# Patient Record
Sex: MALE | Race: WHITE | URBAN - METROPOLITAN AREA
[De-identification: names, ages, dates, MRNs, and addresses within clinical notes are randomized per-mention and may not be internally consistent; named-entity substitution may affect disease eponyms.]

---

## 2020-06-03 ENCOUNTER — OFFICE VISIT (OUTPATIENT)
Dept: URBAN - METROPOLITAN AREA TELEHEALTH 2 | Facility: TELEHEALTH | Age: 55
End: 2020-06-03

## 2020-09-11 ENCOUNTER — TELEPHONE ENCOUNTER (OUTPATIENT)
Dept: URBAN - METROPOLITAN AREA CLINIC 54 | Facility: CLINIC | Age: 55
End: 2020-09-11

## 2020-09-15 ENCOUNTER — TELEPHONE ENCOUNTER (OUTPATIENT)
Dept: URBAN - METROPOLITAN AREA CLINIC 54 | Facility: CLINIC | Age: 55
End: 2020-09-15

## 2020-09-15 ENCOUNTER — WEB ENCOUNTER (OUTPATIENT)
Dept: URBAN - NONMETROPOLITAN AREA CLINIC 4 | Facility: CLINIC | Age: 55
End: 2020-09-15

## 2020-10-13 ENCOUNTER — LAB OUTSIDE AN ENCOUNTER (OUTPATIENT)
Dept: URBAN - METROPOLITAN AREA CLINIC 54 | Facility: CLINIC | Age: 55
End: 2020-10-13

## 2020-10-13 ENCOUNTER — OFFICE VISIT (OUTPATIENT)
Dept: URBAN - METROPOLITAN AREA CLINIC 54 | Facility: CLINIC | Age: 55
End: 2020-10-13
Payer: COMMERCIAL

## 2020-10-13 ENCOUNTER — WEB ENCOUNTER (OUTPATIENT)
Dept: URBAN - METROPOLITAN AREA CLINIC 54 | Facility: CLINIC | Age: 55
End: 2020-10-13

## 2020-10-13 DIAGNOSIS — K63.5 COLON POLYPS: ICD-10-CM

## 2020-10-13 DIAGNOSIS — K74.69 OTHER CIRRHOSIS OF LIVER: ICD-10-CM

## 2020-10-13 DIAGNOSIS — K65.2 SPONTANEOUS BACTERIAL PERITONITIS: ICD-10-CM

## 2020-10-13 DIAGNOSIS — R18.8 ASCITES: ICD-10-CM

## 2020-10-13 DIAGNOSIS — I10 HTN (HYPERTENSION): ICD-10-CM

## 2020-10-13 DIAGNOSIS — K22.70 BARRETTS ESOPHAGUS: ICD-10-CM

## 2020-10-13 DIAGNOSIS — K57.90 DIVERTICULOSIS: ICD-10-CM

## 2020-10-13 PROBLEM — 398050005 DIVERTICULAR DISEASE OF COLON: Status: ACTIVE | Noted: 2020-10-13

## 2020-10-13 PROCEDURE — G8754 DIAS BP LESS 90: HCPCS | Performed by: INTERNAL MEDICINE

## 2020-10-13 PROCEDURE — G9909 DOC MED RSN NO TBCO INTERV: HCPCS | Performed by: INTERNAL MEDICINE

## 2020-10-13 PROCEDURE — G8482 FLU IMMUNIZE ORDER/ADMIN: HCPCS | Performed by: INTERNAL MEDICINE

## 2020-10-13 PROCEDURE — 74170 CT ABD WO CNTRST FLWD CNTRST: CPT | Performed by: INTERNAL MEDICINE

## 2020-10-13 PROCEDURE — 99215 OFFICE O/P EST HI 40 MIN: CPT | Performed by: INTERNAL MEDICINE

## 2020-10-13 PROCEDURE — G9902 PT SCRN TBCO AND ID AS USER: HCPCS | Performed by: INTERNAL MEDICINE

## 2020-10-13 PROCEDURE — 99214 OFFICE O/P EST MOD 30 MIN: CPT | Performed by: INTERNAL MEDICINE

## 2020-10-13 PROCEDURE — G8752 SYS BP LESS 140: HCPCS | Performed by: INTERNAL MEDICINE

## 2020-10-13 PROCEDURE — G8427 DOCREV CUR MEDS BY ELIG CLIN: HCPCS | Performed by: INTERNAL MEDICINE

## 2020-10-13 PROCEDURE — G8417 CALC BMI ABV UP PARAM F/U: HCPCS | Performed by: INTERNAL MEDICINE

## 2020-10-13 RX ORDER — EZETIMIBE 10 MG/1
TAKE 1 TABLET (10 MG) BY ORAL ROUTE ONCE DAILY TABLET ORAL 1
Qty: 0 | Refills: 0 | Status: ACTIVE | COMMUNITY
Start: 1900-01-01

## 2020-10-13 RX ORDER — LISINOPRIL 40 MG/1
TABLET ORAL
Qty: 0 | Refills: 0 | Status: ACTIVE | COMMUNITY
Start: 1900-01-01

## 2020-10-13 RX ORDER — PANTOPRAZOLE SODIUM 40 MG/1
TABLET, DELAYED RELEASE ORAL
Qty: 0 | Refills: 0 | Status: ACTIVE | COMMUNITY
Start: 1900-01-01

## 2020-10-13 RX ORDER — SIMVASTATIN 40 MG/1
TABLET, FILM COATED ORAL
Qty: 0 | Refills: 0 | Status: ACTIVE | COMMUNITY
Start: 1900-01-01

## 2020-10-13 RX ORDER — SPIRONOLACTONE 100 MG/1
1 TABLET TABLET, FILM COATED ORAL ONCE A DAY
Qty: 30 TABLET | Refills: 6 | OUTPATIENT

## 2020-10-13 RX ORDER — FUROSEMIDE 40 MG/1
1 TABLET TABLET ORAL ONCE A DAY
Qty: 30 TABLET | Refills: 6 | OUTPATIENT
Start: 2020-10-13

## 2020-10-13 RX ORDER — CELECOXIB 200 MG/1
CAPSULE ORAL
Qty: 0 | Refills: 0 | Status: ACTIVE | COMMUNITY
Start: 1900-01-01

## 2020-10-13 RX ORDER — PANTOPRAZOLE SODIUM 40 MG/1
TABLET, DELAYED RELEASE ORAL
Qty: 0
Start: 1900-01-01

## 2020-10-13 RX ORDER — AMLODIPINE BESYLATE 10 MG/1
TAKE 1 TABLET (10 MG) BY ORAL ROUTE ONCE DAILY TABLET ORAL 1
Qty: 0 | Refills: 0 | Status: ACTIVE | COMMUNITY
Start: 1900-01-01

## 2020-10-13 NOTE — HPI-TODAY'S VISIT:
Pt with hx of etoh cirrhosis, Pt reports that he is due for colonoscopy.  Pt with indigestion gas tingling and burning. Pt reports this is similar to pain with Barretts and ulcers.  Pt report that the back pain is paraspinal in nature. c/o nausea for 2 months.  Pt reports that he is taking laxatives.  Pt reports that he has black stools. Is taking celecoxib.  Pt reports that he continues to smoke and drink.  Pt last paracentesis with 5.7 liters removed +hx of SBP

## 2020-10-13 NOTE — PHYSICAL EXAM GASTROINTESTINAL
Abdomen ,, no guarding or rigidity , no masses palpable , normal bowel sounds , Liver and Spleen , no hepatomegaly present , splenomegaly +tenese ascites

## 2020-10-13 NOTE — EXAM-PHYSICAL EXAM
METFORMIN  MG Tablet         Last Written Prescription Date: 9/14/2016  Last Fill Quantity: 90, # refills: 1  Last Office Visit with G, P or [facility] prescribing provider:  10/3/2016        BP Readings from Last 3 Encounters:   10/03/16 124/70   08/22/16 120/70   07/12/16 120/68     MICROL       <5   10/3/2016  No results found for this basename: microalbumin  CREATININE   Date Value Ref Range Status   08/23/2016 0.91 0.66 - 1.25 mg/dL Final   ]  GFR ESTIMATE   Date Value Ref Range Status   08/23/2016 84 >60 mL/min/1.7m2 Final   07/13/2016 >90 >60 mL/min/1.7m2 Final   11/16/2015 75 >60 mL/min/1.7m2 Final     GFR ESTIMATE IF BLACK   Date Value Ref Range Status   08/23/2016 >90 >60 mL/min/1.7m2 Final   07/13/2016 >90 >60 mL/min/1.7m2 Final   11/16/2015 >90 >60 mL/min/1.7m2 Final     CHOL      112   7/13/2016  HDL       73   7/13/2016  LDL       30   7/13/2016  LDL     89.4   9/20/2012  TRIG       44   7/13/2016  CHOLHDLRATIO      1.9   11/16/2015  AST       22   7/13/2016  ALT       29   7/13/2016  A1C      7.3   10/3/2016  A1C      7.0   7/13/2016  A1C      6.7   2/17/2016  A1C      8.2   11/16/2015  A1C      7.2   5/4/2015  POTASSIUM   Date Value Ref Range Status   08/23/2016 4.1 3.4 - 5.3 mmol/L Final        spider nevi TEnse ascites +umbilical hernia 1+ edema b/; no asterexis

## 2020-10-14 LAB
A/G RATIO: 1.5
AFP, SERUM, TUMOR MARKER: 8.9
ALBUMIN: 4
ALKALINE PHOSPHATASE: 91
ALT (SGPT): 16
APTT: 33
AST (SGOT): 36
BASO (ABSOLUTE): 0
BASOS: 0
BILIRUBIN, TOTAL: 0.8
BUN/CREATININE RATIO: 11
BUN: 24
CALCIUM: 9.7
CARBON DIOXIDE, TOTAL: 18
CHLORIDE: 91
CREATININE: 2.14
EGFR IF AFRICN AM: 39
EGFR IF NONAFRICN AM: 33
EOS (ABSOLUTE): 0
EOS: 0
GLOBULIN, TOTAL: 2.7
GLUCOSE: 126
HEMATOCRIT: 40.2
HEMATOLOGY COMMENTS:: (no result)
HEMOGLOBIN: 14
IMMATURE CELLS: (no result)
IMMATURE GRANS (ABS): 0
IMMATURE GRANULOCYTES: 0
INR: 1.3
LYMPHS (ABSOLUTE): 1.1
LYMPHS: 10
MCH: 36.4
MCHC: 34.8
MCV: 104
MONOCYTES(ABSOLUTE): 0.8
MONOCYTES: 7
NEUTROPHILS (ABSOLUTE): 9.6
NEUTROPHILS: 83
NRBC: (no result)
PLATELETS: 183
POTASSIUM: 5
PROTEIN, TOTAL: 6.7
PROTHROMBIN TIME: 13.2
RBC: 3.85
RDW: 11.5
SODIUM: 127
WBC: 11.6

## 2020-11-04 ENCOUNTER — OFFICE VISIT (OUTPATIENT)
Dept: URBAN - METROPOLITAN AREA SURGERY CENTER 14 | Facility: SURGERY CENTER | Age: 55
End: 2020-11-04
Payer: COMMERCIAL

## 2020-11-04 DIAGNOSIS — K74.60 ADVANCED CIRRHOSIS OF LIVER: ICD-10-CM

## 2020-11-04 DIAGNOSIS — K31.89 ACQUIRED DEFORMITY OF DUODENUM: ICD-10-CM

## 2020-11-04 DIAGNOSIS — I85.10 ESOPH VARICE OTHER DIS: ICD-10-CM

## 2020-11-04 DIAGNOSIS — K22.8 COLUMNAR-LINED ESOPHAGUS: ICD-10-CM

## 2020-11-04 PROCEDURE — 43235 EGD DIAGNOSTIC BRUSH WASH: CPT | Performed by: INTERNAL MEDICINE

## 2020-11-04 PROCEDURE — G8907 PT DOC NO EVENTS ON DISCHARG: HCPCS | Performed by: INTERNAL MEDICINE

## 2020-11-04 RX ORDER — PANTOPRAZOLE SODIUM 40 MG/1
TABLET, DELAYED RELEASE ORAL
Qty: 0 | Status: ACTIVE | COMMUNITY
Start: 1900-01-01

## 2020-11-04 RX ORDER — SIMVASTATIN 40 MG/1
TABLET, FILM COATED ORAL
Qty: 0 | Refills: 0 | Status: ACTIVE | COMMUNITY
Start: 1900-01-01

## 2020-11-04 RX ORDER — CELECOXIB 200 MG/1
CAPSULE ORAL
Qty: 0 | Refills: 0 | Status: ACTIVE | COMMUNITY
Start: 1900-01-01

## 2020-11-04 RX ORDER — LISINOPRIL 40 MG/1
TABLET ORAL
Qty: 0 | Refills: 0 | Status: ACTIVE | COMMUNITY
Start: 1900-01-01

## 2020-11-04 RX ORDER — FUROSEMIDE 40 MG/1
1 TABLET TABLET ORAL ONCE A DAY
Qty: 30 TABLET | Refills: 6 | Status: ACTIVE | COMMUNITY
Start: 2020-10-13

## 2020-11-04 RX ORDER — AMLODIPINE BESYLATE 10 MG/1
TAKE 1 TABLET (10 MG) BY ORAL ROUTE ONCE DAILY TABLET ORAL 1
Qty: 0 | Refills: 0 | Status: ACTIVE | COMMUNITY
Start: 1900-01-01

## 2020-11-04 RX ORDER — SPIRONOLACTONE 100 MG/1
1 TABLET TABLET, FILM COATED ORAL ONCE A DAY
Qty: 30 TABLET | Refills: 6 | Status: ACTIVE | COMMUNITY

## 2020-11-04 RX ORDER — EZETIMIBE 10 MG/1
TAKE 1 TABLET (10 MG) BY ORAL ROUTE ONCE DAILY TABLET ORAL 1
Qty: 0 | Refills: 0 | Status: ACTIVE | COMMUNITY
Start: 1900-01-01

## 2020-11-12 ENCOUNTER — TELEPHONE ENCOUNTER (OUTPATIENT)
Dept: URBAN - METROPOLITAN AREA CLINIC 54 | Facility: CLINIC | Age: 55
End: 2020-11-12

## 2020-11-12 RX ORDER — SPIRONOLACTONE 100 MG/1
1 TABLET TABLET, FILM COATED ORAL BID
Qty: 60 TABLET | Refills: 2

## 2020-11-12 RX ORDER — FUROSEMIDE 40 MG/1
ORALLY TABLET ORAL BID
Qty: 60 | Refills: 2
Start: 2020-10-13

## 2020-11-20 ENCOUNTER — TELEPHONE ENCOUNTER (OUTPATIENT)
Dept: URBAN - METROPOLITAN AREA CLINIC 92 | Facility: CLINIC | Age: 55
End: 2020-11-20

## 2020-11-20 RX ORDER — CIPROFLOXACIN HYDROCHLORIDE 500 MG/1
TAKE 1 TABLET BY ORAL ROUTE DAILY FOR 30 DAYS TABLET, FILM COATED ORAL 1
Qty: 30 | Refills: 3

## 2020-12-18 ENCOUNTER — TELEPHONE ENCOUNTER (OUTPATIENT)
Dept: URBAN - METROPOLITAN AREA CLINIC 54 | Facility: CLINIC | Age: 55
End: 2020-12-18

## 2020-12-18 RX ORDER — PANTOPRAZOLE SODIUM 40 MG/1
1 TABLET TABLET, DELAYED RELEASE ORAL ONCE A DAY
Qty: 90 TABLET | Refills: 2

## 2020-12-28 ENCOUNTER — TELEPHONE ENCOUNTER (OUTPATIENT)
Dept: URBAN - NONMETROPOLITAN AREA CLINIC 4 | Facility: CLINIC | Age: 55
End: 2020-12-28

## 2020-12-29 ENCOUNTER — TELEPHONE ENCOUNTER (OUTPATIENT)
Dept: URBAN - METROPOLITAN AREA CLINIC 92 | Facility: CLINIC | Age: 55
End: 2020-12-29

## 2020-12-30 ENCOUNTER — TELEPHONE ENCOUNTER (OUTPATIENT)
Dept: URBAN - METROPOLITAN AREA CLINIC 96 | Facility: CLINIC | Age: 55
End: 2020-12-30

## 2021-01-12 ENCOUNTER — OFFICE VISIT (OUTPATIENT)
Dept: URBAN - NONMETROPOLITAN AREA CLINIC 4 | Facility: CLINIC | Age: 56
End: 2021-01-12
Payer: COMMERCIAL

## 2021-01-12 VITALS
TEMPERATURE: 98.1 F | SYSTOLIC BLOOD PRESSURE: 129 MMHG | DIASTOLIC BLOOD PRESSURE: 83 MMHG | BODY MASS INDEX: 29.48 KG/M2 | WEIGHT: 210.6 LBS | HEIGHT: 71 IN | HEART RATE: 115 BPM

## 2021-01-12 DIAGNOSIS — K74.69 OTHER CIRRHOSIS OF LIVER: ICD-10-CM

## 2021-01-12 DIAGNOSIS — R11.2 NAUSEA & VOMITING: ICD-10-CM

## 2021-01-12 DIAGNOSIS — K22.70 BARRETTS ESOPHAGUS: ICD-10-CM

## 2021-01-12 DIAGNOSIS — R18.8 ASCITES: ICD-10-CM

## 2021-01-12 PROCEDURE — 99215 OFFICE O/P EST HI 40 MIN: CPT | Performed by: INTERNAL MEDICINE

## 2021-01-12 RX ORDER — EZETIMIBE 10 MG/1
TAKE 1 TABLET (10 MG) BY ORAL ROUTE ONCE DAILY TABLET ORAL 1
Qty: 0 | Refills: 0 | Status: ACTIVE | COMMUNITY
Start: 1900-01-01

## 2021-01-12 RX ORDER — SIMVASTATIN 40 MG/1
TABLET, FILM COATED ORAL
Qty: 0 | Refills: 0 | Status: ACTIVE | COMMUNITY
Start: 1900-01-01

## 2021-01-12 RX ORDER — CIPROFLOXACIN HYDROCHLORIDE 500 MG/1
TAKE 1 TABLET BY ORAL ROUTE DAILY FOR 30 DAYS TABLET, FILM COATED ORAL 1
Qty: 30 | Refills: 3 | Status: ACTIVE | COMMUNITY

## 2021-01-12 RX ORDER — CELECOXIB 200 MG/1
CAPSULE ORAL
Qty: 0 | Refills: 0 | Status: DISCONTINUED | COMMUNITY
Start: 1900-01-01

## 2021-01-12 RX ORDER — SPIRONOLACTONE 100 MG/1
1 TABLET TABLET, FILM COATED ORAL BID
Qty: 60 TABLET | Refills: 2 | Status: ACTIVE | COMMUNITY

## 2021-01-12 RX ORDER — PANTOPRAZOLE SODIUM 40 MG/1
1 TABLET TABLET, DELAYED RELEASE ORAL ONCE A DAY
Qty: 90 TABLET | Refills: 2 | Status: ACTIVE | COMMUNITY

## 2021-01-12 RX ORDER — LISINOPRIL 40 MG/1
TABLET ORAL
Qty: 0 | Refills: 0 | Status: DISCONTINUED | COMMUNITY
Start: 1900-01-01

## 2021-01-12 RX ORDER — ONDANSETRON HYDROCHLORIDE 8 MG/1
1 TABLET AS NEEDED TABLET, FILM COATED ORAL ONCE A DAY
Qty: 30 TABLET | Refills: 2 | OUTPATIENT
Start: 2021-01-12

## 2021-01-12 RX ORDER — FUROSEMIDE 40 MG/1
ORALLY TABLET ORAL BID
Qty: 60 | Refills: 2 | Status: ACTIVE | COMMUNITY
Start: 2020-10-13

## 2021-01-12 RX ORDER — ICOSAPENT ETHYL 1000 MG/1
2 CAPSULES WITH MEALS CAPSULE ORAL TWICE A DAY
Status: ACTIVE | COMMUNITY

## 2021-01-12 RX ORDER — AMLODIPINE BESYLATE 10 MG/1
TAKE 1 TABLET (10 MG) BY ORAL ROUTE ONCE DAILY TABLET ORAL 1
Qty: 0 | Refills: 0 | Status: ACTIVE | COMMUNITY
Start: 1900-01-01

## 2021-01-12 NOTE — HPI-TODAY'S VISIT:
hx of etoh cirrhosis present for f/uvisit. Pt reports nausea since this morning. pt withhx of dm/htn.dyslipidemia and SBP. UTD on HCC screening and EGD surveillance with EGD 12/2020 small varices and HCC surv with CT scan in Nov 2020 neg for lesions., On Cipro daily. Last LVP Dec 30 2020 with 9..6 L removed. Secondary hepatic w/u neg. Continues to drink.

## 2021-01-19 ENCOUNTER — OFFICE VISIT (OUTPATIENT)
Dept: URBAN - NONMETROPOLITAN AREA CLINIC 4 | Facility: CLINIC | Age: 56
End: 2021-01-19
Payer: COMMERCIAL

## 2021-01-19 VITALS
DIASTOLIC BLOOD PRESSURE: 73 MMHG | HEART RATE: 87 BPM | BODY MASS INDEX: 26.8 KG/M2 | WEIGHT: 191.4 LBS | TEMPERATURE: 97.3 F | SYSTOLIC BLOOD PRESSURE: 157 MMHG | HEIGHT: 71 IN

## 2021-01-19 DIAGNOSIS — K22.70 BARRETTS ESOPHAGUS: ICD-10-CM

## 2021-01-19 DIAGNOSIS — Z72.0 TOBACCO ABUSE: ICD-10-CM

## 2021-01-19 DIAGNOSIS — K74.60 CIRRHOSIS: ICD-10-CM

## 2021-01-19 DIAGNOSIS — I10 HTN (HYPERTENSION): ICD-10-CM

## 2021-01-19 DIAGNOSIS — K63.5 COLON POLYPS: ICD-10-CM

## 2021-01-19 DIAGNOSIS — E11.9 DIABETES: ICD-10-CM

## 2021-01-19 DIAGNOSIS — E87.1 HYPONATREMIA: ICD-10-CM

## 2021-01-19 DIAGNOSIS — R18.8 ASCITES: ICD-10-CM

## 2021-01-19 DIAGNOSIS — R11.2 NAUSEA & VOMITING: ICD-10-CM

## 2021-01-19 PROCEDURE — 99213 OFFICE O/P EST LOW 20 MIN: CPT | Performed by: REGISTERED NURSE

## 2021-01-19 PROCEDURE — 3017F COLORECTAL CA SCREEN DOC REV: CPT | Performed by: REGISTERED NURSE

## 2021-01-19 PROCEDURE — G8427 DOCREV CUR MEDS BY ELIG CLIN: HCPCS | Performed by: REGISTERED NURSE

## 2021-01-19 PROCEDURE — G8417 CALC BMI ABV UP PARAM F/U: HCPCS | Performed by: REGISTERED NURSE

## 2021-01-19 PROCEDURE — G8482 FLU IMMUNIZE ORDER/ADMIN: HCPCS | Performed by: REGISTERED NURSE

## 2021-01-19 PROCEDURE — 4004F PT TOBACCO SCREEN RCVD TLK: CPT | Performed by: REGISTERED NURSE

## 2021-01-19 PROCEDURE — G8754 DIAS BP LESS 90: HCPCS | Performed by: REGISTERED NURSE

## 2021-01-19 PROCEDURE — G8752 SYS BP LESS 140: HCPCS | Performed by: REGISTERED NURSE

## 2021-01-19 RX ORDER — SPIRONOLACTONE 100 MG/1
1 TABLET TABLET, FILM COATED ORAL BID
Qty: 60 TABLET | Refills: 2 | Status: ACTIVE | COMMUNITY

## 2021-01-19 RX ORDER — ONDANSETRON HYDROCHLORIDE 8 MG/1
1 TABLET AS NEEDED TABLET, FILM COATED ORAL ONCE A DAY
Qty: 30 TABLET | Refills: 2 | Status: ACTIVE | COMMUNITY

## 2021-01-19 RX ORDER — CIPROFLOXACIN HYDROCHLORIDE 500 MG/1
TAKE 1 TABLET BY ORAL ROUTE DAILY FOR 30 DAYS TABLET, FILM COATED ORAL 1
Qty: 30 | Refills: 3 | Status: ACTIVE | COMMUNITY

## 2021-01-19 RX ORDER — PANTOPRAZOLE SODIUM 40 MG/1
1 TABLET TABLET, DELAYED RELEASE ORAL ONCE A DAY
Qty: 90 TABLET | Refills: 2 | Status: ACTIVE | COMMUNITY

## 2021-01-19 RX ORDER — SIMVASTATIN 40 MG/1
TABLET, FILM COATED ORAL
Qty: 0 | Refills: 0 | Status: ACTIVE | COMMUNITY

## 2021-01-19 RX ORDER — AMLODIPINE BESYLATE 10 MG/1
TAKE 1 TABLET (10 MG) BY ORAL ROUTE ONCE DAILY TABLET ORAL 1
Qty: 0 | Refills: 0 | Status: ACTIVE | COMMUNITY

## 2021-01-19 RX ORDER — ICOSAPENT ETHYL 1000 MG/1
2 CAPSULES WITH MEALS CAPSULE ORAL TWICE A DAY
Status: ACTIVE | COMMUNITY

## 2021-01-19 RX ORDER — FUROSEMIDE 40 MG/1
ORALLY TABLET ORAL BID
Qty: 60 | Refills: 2 | Status: ACTIVE | COMMUNITY

## 2021-01-19 RX ORDER — EZETIMIBE 10 MG/1
TAKE 1 TABLET (10 MG) BY ORAL ROUTE ONCE DAILY TABLET ORAL 1
Qty: 0 | Refills: 0 | Status: ACTIVE | COMMUNITY

## 2021-01-19 NOTE — HPI-TODAY'S VISIT:
hx of etoh cirrhosis present for f/uvisit. Pt reports nausea since this morning. pt withhx of dm/htn.dyslipidemia and SBP. UTD on HCC screening and EGD surveillance with EGD 12/2020 small varices and HCC surv with CT scan in Nov 2020 neg for lesions., On Cipro daily. Last LVP Dec 30 2020 with 9..6 L removed. Secondary hepatic w/u neg. Continues to drink. Pt with hx of etoh cirrhosis, Pt reports that he is due for colonoscopy.  Pt with indigestion gas tingling and burning. Pt reports this is similar to pain with Barretts and ulcers.  Pt report that the back pain is paraspinal in nature. c/o nausea for 2 months.  Pt reports that he is taking laxatives.  Pt reports that he has black stools. Is taking celecoxib.  Pt reports that he continues to smoke and drink.  Pt last paracentesis with 5.7 liters removed +hx of SBP   1/19/21: Pt RTC for f/u. He had US para on 1/12 with 8400 mL removed. Fluid studies not c/w SBP. MELD 21. He remains on aldactone and lasix, but only taking one of them once a day. He denies any abdominal pain or reaccumulation of ascites. Last alcoholic drink on 1/11/21. He started taking a Florajen probiotic recently, which has been helping with his digestion. His last cscope was 12/20/2018, due for surveillance in 2021.

## 2021-01-19 NOTE — PHYSICAL EXAM GASTROINTESTINAL
Abdomen , soft, excess abdominal fat, nontender, nondistended , no guarding or rigidity , no masses palpable , normal bowel sounds , Liver and Spleen , no hepatomegaly present , no hepatosplenomegaly , liver nontender , spleen not palpable

## 2021-01-19 NOTE — PHYSICAL EXAM HENT:
Head, normocephalic, atraumatic, Face, Face within normal limits, Ears, External ears within normal limits,

## 2021-01-20 ENCOUNTER — OFFICE VISIT (OUTPATIENT)
Dept: URBAN - NONMETROPOLITAN AREA CLINIC 4 | Facility: CLINIC | Age: 56
End: 2021-01-20
Payer: COMMERCIAL

## 2021-01-20 DIAGNOSIS — R18.8 ASCITES: ICD-10-CM

## 2021-01-20 DIAGNOSIS — K63.5 COLON POLYPS: ICD-10-CM

## 2021-01-20 DIAGNOSIS — E11.9 DIABETES: ICD-10-CM

## 2021-01-20 DIAGNOSIS — R11.2 NAUSEA & VOMITING: ICD-10-CM

## 2021-01-20 DIAGNOSIS — E87.1 HYPONATREMIA: ICD-10-CM

## 2021-01-20 DIAGNOSIS — I85.00 ESOPHAGEAL VARICES: ICD-10-CM

## 2021-01-20 DIAGNOSIS — Z72.0 TOBACCO ABUSE: ICD-10-CM

## 2021-01-20 DIAGNOSIS — K22.70 BARRETTS ESOPHAGUS: ICD-10-CM

## 2021-01-20 DIAGNOSIS — I10 HTN (HYPERTENSION): ICD-10-CM

## 2021-01-20 DIAGNOSIS — K74.60 CIRRHOSIS: ICD-10-CM

## 2021-01-20 PROCEDURE — 1036F TOBACCO NON-USER: CPT | Performed by: INTERNAL MEDICINE

## 2021-01-20 PROCEDURE — G8417 CALC BMI ABV UP PARAM F/U: HCPCS | Performed by: INTERNAL MEDICINE

## 2021-01-20 PROCEDURE — G8482 FLU IMMUNIZE ORDER/ADMIN: HCPCS | Performed by: INTERNAL MEDICINE

## 2021-01-20 PROCEDURE — 99214 OFFICE O/P EST MOD 30 MIN: CPT | Performed by: INTERNAL MEDICINE

## 2021-01-20 PROCEDURE — 3017F COLORECTAL CA SCREEN DOC REV: CPT | Performed by: INTERNAL MEDICINE

## 2021-01-20 PROCEDURE — G8427 DOCREV CUR MEDS BY ELIG CLIN: HCPCS | Performed by: INTERNAL MEDICINE

## 2021-01-20 RX ORDER — CIPROFLOXACIN HYDROCHLORIDE 500 MG/1
TAKE 1 TABLET BY ORAL ROUTE DAILY FOR 30 DAYS TABLET, FILM COATED ORAL 1
Qty: 30 | Refills: 3 | Status: ACTIVE | COMMUNITY

## 2021-01-20 RX ORDER — SPIRONOLACTONE 100 MG/1
1 TABLET TABLET, FILM COATED ORAL BID
Qty: 60 TABLET | Refills: 2 | Status: ACTIVE | COMMUNITY

## 2021-01-20 RX ORDER — PANTOPRAZOLE SODIUM 40 MG/1
1 TABLET TABLET, DELAYED RELEASE ORAL ONCE A DAY
Qty: 90 TABLET | Refills: 2 | Status: ACTIVE | COMMUNITY

## 2021-01-20 RX ORDER — SIMVASTATIN 40 MG/1
TABLET, FILM COATED ORAL
Qty: 0 | Refills: 0 | Status: ACTIVE | COMMUNITY

## 2021-01-20 RX ORDER — AMLODIPINE BESYLATE 10 MG/1
TAKE 1 TABLET (10 MG) BY ORAL ROUTE ONCE DAILY TABLET ORAL 1
Qty: 0 | Refills: 0 | Status: ACTIVE | COMMUNITY

## 2021-01-20 RX ORDER — EZETIMIBE 10 MG/1
TAKE 1 TABLET (10 MG) BY ORAL ROUTE ONCE DAILY TABLET ORAL 1
Qty: 0 | Refills: 0 | Status: ACTIVE | COMMUNITY

## 2021-01-20 RX ORDER — ICOSAPENT ETHYL 1000 MG/1
2 CAPSULES WITH MEALS CAPSULE ORAL TWICE A DAY
Status: ACTIVE | COMMUNITY

## 2021-01-20 RX ORDER — ONDANSETRON HYDROCHLORIDE 8 MG/1
1 TABLET AS NEEDED TABLET, FILM COATED ORAL ONCE A DAY
Qty: 30 TABLET | Refills: 2 | Status: ACTIVE | COMMUNITY

## 2021-01-20 RX ORDER — FUROSEMIDE 40 MG/1
ORALLY TABLET ORAL BID
Qty: 60 | Refills: 2 | Status: ACTIVE | COMMUNITY

## 2021-01-20 NOTE — HPI-TODAY'S VISIT:
hx of etoh cirrhosis present for f/uvisit. Pt reports nausea since this morning. pt withhx of dm/htn.dyslipidemia and SBP. UTD on HCC screening and EGD surveillance with EGD 12/2020 small varices and HCC surv with CT scan in Nov 2020 neg for lesions., On Cipro daily. Last LVP Dec 30 2020 with 9..6 L removed. Secondary hepatic w/u neg. Continues to drink. Pt with hx of etoh cirrhosis, Pt reports that he is due for colonoscopy.  Pt with indigestion gas tingling and burning. Pt reports this is similar to pain with Barretts and ulcers.  Pt report that the back pain is paraspinal in nature. c/o nausea for 2 months.  Pt reports that he is taking laxatives.  Pt reports that he has black stools. Is taking celecoxib.  Pt reports that he continues to smoke and drink.  Pt last paracentesis with 5.7 liters removed +hx of SBP   1/19/21: Pt RTC for f/u. He had US para on 1/12 with 8400 mL removed. Fluid studies not c/w SBP. MELD 21. He remains on aldactone and lasix, but only taking one of them once a day. He denies any abdominal pain or reaccumulation of ascites. Last alcoholic drink on 1/11/21. He started taking a Florajen probiotic recently, which has been helping with his digestion. His last cscope was 12/20/2018, due for surveillance in 2021.  Follow Up 1/20/21: Patient presents for liver related opinion. He carries a diagnosis of ETOH cirrhosis with portal hypertension requiring LVPs. His last drink was  1/11/21. He smokes 1 ppd x 50 years. He had cataract surgery Nov 2020. He has never been evaluated for OLT. He works as a  and involves travel. He lives with his girlfriend in [place]. His diaghter is a NP in [place].

## 2021-01-20 NOTE — PHYSICAL EXAM CONSTITUTIONAL:
well developed, well nourished , in no acute distress , ambulating without difficulty , normal communication ability 
labored

## 2021-01-20 NOTE — PHYSICAL EXAM GASTROINTESTINAL
Abdomen , soft, nontender, moderately distended with ascites , no guarding or rigidity , no masses palpable , normal bowel sounds , Liver and Spleen , no hepatomegaly present , no hepatosplenomegaly , liver nontender , spleen not palpable

## 2021-03-12 ENCOUNTER — TELEPHONE ENCOUNTER (OUTPATIENT)
Dept: URBAN - METROPOLITAN AREA CLINIC 54 | Facility: CLINIC | Age: 56
End: 2021-03-12

## 2021-04-06 ENCOUNTER — TELEPHONE ENCOUNTER (OUTPATIENT)
Dept: URBAN - METROPOLITAN AREA CLINIC 54 | Facility: CLINIC | Age: 56
End: 2021-04-06

## 2021-04-20 LAB
A/G RATIO: 1.6
ALBUMIN: 3.7
ALKALINE PHOSPHATASE: 130
ALT (SGPT): 19
AST (SGOT): 39
BASO (ABSOLUTE): 0
BASOS: 0
BILIRUBIN, TOTAL: 0.5
BUN/CREATININE RATIO: 20
BUN: 19
CALCIUM: 9
CARBON DIOXIDE, TOTAL: 20
CHLORIDE: 97
CREATININE: 0.93
EGFR IF AFRICN AM: 106
EGFR IF NONAFRICN AM: 91
EOS (ABSOLUTE): 0
EOS: 1
GLOBULIN, TOTAL: 2.3
GLUCOSE: 109
HEMATOCRIT: 38.6
HEMATOLOGY COMMENTS:: (no result)
HEMOGLOBIN: 13.5
IMMATURE CELLS: (no result)
IMMATURE GRANS (ABS): 0
IMMATURE GRANULOCYTES: 1
INR: 1.1
LYMPHS (ABSOLUTE): 1.3
LYMPHS: 16
MCH: 35.7
MCHC: 35
MCV: 102
MONOCYTES(ABSOLUTE): 0.7
MONOCYTES: 9
NEUTROPHILS (ABSOLUTE): 6.1
NEUTROPHILS: 73
NRBC: (no result)
PLATELETS: 191
POTASSIUM: 4.3
PROTEIN, TOTAL: 6
PROTHROMBIN TIME: 11.3
RBC: 3.78
RDW: 12.9
SODIUM: 132
WBC: 8.2

## 2021-04-21 ENCOUNTER — OFFICE VISIT (OUTPATIENT)
Dept: URBAN - NONMETROPOLITAN AREA CLINIC 4 | Facility: CLINIC | Age: 56
End: 2021-04-21
Payer: COMMERCIAL

## 2021-04-21 DIAGNOSIS — I10 HTN (HYPERTENSION): ICD-10-CM

## 2021-04-21 DIAGNOSIS — K74.60 CIRRHOSIS: ICD-10-CM

## 2021-04-21 DIAGNOSIS — R11.2 NAUSEA & VOMITING: ICD-10-CM

## 2021-04-21 DIAGNOSIS — R18.8 ASCITES: ICD-10-CM

## 2021-04-21 DIAGNOSIS — E87.1 HYPONATREMIA: ICD-10-CM

## 2021-04-21 DIAGNOSIS — Z72.0 TOBACCO ABUSE: ICD-10-CM

## 2021-04-21 DIAGNOSIS — E11.9 DIABETES: ICD-10-CM

## 2021-04-21 DIAGNOSIS — K22.70 BARRETTS ESOPHAGUS: ICD-10-CM

## 2021-04-21 DIAGNOSIS — I85.00 ESOPHAGEAL VARICES: ICD-10-CM

## 2021-04-21 DIAGNOSIS — K63.5 COLON POLYPS: ICD-10-CM

## 2021-04-21 PROBLEM — 389026000 ASCITES: Status: ACTIVE | Noted: 2020-10-13

## 2021-04-21 PROBLEM — 28670008 ESOPHAGEAL VARICES: Status: ACTIVE | Noted: 2021-01-20

## 2021-04-21 PROBLEM — 255417007 CIRRHOTIC: Status: ACTIVE | Noted: 2020-10-13

## 2021-04-21 PROBLEM — 111552007 DIABETES MELLITUS WITHOUT COMPLICATION: Status: ACTIVE | Noted: 2021-01-19

## 2021-04-21 PROBLEM — 38341003 HYPERTENSION: Status: ACTIVE | Noted: 2020-10-13

## 2021-04-21 PROCEDURE — 99214 OFFICE O/P EST MOD 30 MIN: CPT | Performed by: INTERNAL MEDICINE

## 2021-04-21 RX ORDER — CIPROFLOXACIN HYDROCHLORIDE 500 MG/1
TAKE 1 TABLET BY ORAL ROUTE DAILY FOR 30 DAYS TABLET, FILM COATED ORAL 1
Qty: 30 | Refills: 3 | Status: ACTIVE | COMMUNITY

## 2021-04-21 RX ORDER — FUROSEMIDE 40 MG/1
ORALLY TABLET ORAL BID
Qty: 60 | Refills: 2 | Status: ACTIVE | COMMUNITY

## 2021-04-21 RX ORDER — ICOSAPENT ETHYL 1000 MG/1
2 CAPSULES WITH MEALS CAPSULE ORAL TWICE A DAY
Status: ACTIVE | COMMUNITY

## 2021-04-21 RX ORDER — PANTOPRAZOLE SODIUM 40 MG/1
1 TABLET TABLET, DELAYED RELEASE ORAL ONCE A DAY
Qty: 90 TABLET | Refills: 2 | Status: ACTIVE | COMMUNITY

## 2021-04-21 RX ORDER — SPIRONOLACTONE 100 MG/1
1 TABLET TABLET, FILM COATED ORAL BID
Qty: 60 TABLET | Refills: 2 | Status: ACTIVE | COMMUNITY

## 2021-04-21 RX ORDER — EZETIMIBE 10 MG/1
TAKE 1 TABLET (10 MG) BY ORAL ROUTE ONCE DAILY TABLET ORAL 1
Qty: 0 | Refills: 0 | Status: ACTIVE | COMMUNITY

## 2021-04-21 RX ORDER — ONDANSETRON HYDROCHLORIDE 8 MG/1
1 TABLET AS NEEDED TABLET, FILM COATED ORAL ONCE A DAY
Qty: 30 TABLET | Refills: 2 | Status: ACTIVE | COMMUNITY

## 2021-04-21 RX ORDER — SIMVASTATIN 40 MG/1
TABLET, FILM COATED ORAL
Qty: 0 | Refills: 0 | Status: ACTIVE | COMMUNITY

## 2021-04-21 RX ORDER — AMLODIPINE BESYLATE 10 MG/1
TAKE 1 TABLET (10 MG) BY ORAL ROUTE ONCE DAILY TABLET ORAL 1
Qty: 0 | Refills: 0 | Status: ACTIVE | COMMUNITY

## 2021-04-21 NOTE — PHYSICAL EXAM GASTROINTESTINAL
Abdomen , soft, nontender, severely distended with ascites , no guarding or rigidity , no masses palpable , normal bowel sounds , Liver and Spleen , no hepatosplenomegaly palpable, liver nontender , spleen not palpable

## 2021-04-21 NOTE — HPI-TODAY'S VISIT:
hx of etoh cirrhosis present for f/uvisit. Pt reports nausea since this morning. pt withhx of dm/htn.dyslipidemia and SBP. UTD on HCC screening and EGD surveillance with EGD 12/2020 small varices and HCC surv with CT scan in Nov 2020 neg for lesions., On Cipro daily. Last LVP Dec 30 2020 with 9..6 L removed. Secondary hepatic w/u neg. Continues to drink. Pt with hx of etoh cirrhosis, Pt reports that he is due for colonoscopy.  Pt with indigestion gas tingling and burning. Pt reports this is similar to pain with Barretts and ulcers.  Pt report that the back pain is paraspinal in nature. c/o nausea for 2 months.  Pt reports that he is taking laxatives.  Pt reports that he has black stools. Is taking celecoxib.  Pt reports that he continues to smoke and drink.  Pt last paracentesis with 5.7 liters removed , +hx of SBP   1/19/21: Pt RTC for f/u. He had US para on 1/12 with 8400 mL removed. Fluid studies not c/w SBP. MELD 21. He remains on aldactone and lasix, but only taking one of them once a day. He denies any abdominal pain or reaccumulation of ascites. Last alcoholic drink on 1/11/21. He started taking a Florajen probiotic recently, which has been helping with his digestion. His last cscope was 12/20/2018, due for surveillance in 2021.  Follow Up 1/20/21: Patient presents for liver related opinion. He carries a diagnosis of ETOH cirrhosis with portal hypertension requiring LVPs. His last drink was  1/11/21. He smokes 1 ppd x 50 years. He had cataract surgery Nov 2020. He has never been evaluated for OLT. He works as a  and involves travel. He lives with his girlfriend in [place]. His diaghter is a NP in [place].  Follow Up 4/21/21: Pateint presents for routine follow up. He c/o LUQ dull ache which improves with LVP. His freqency is 2-4 weeks. He A200 and L80 with no results. MELD is 7 but MELD-Na 13 on recent labs. He is consuming on average 5 beers per week. Remains a smoker. No signs of PSE or GIB.

## 2021-05-04 ENCOUNTER — TELEPHONE ENCOUNTER (OUTPATIENT)
Dept: URBAN - METROPOLITAN AREA CLINIC 92 | Facility: CLINIC | Age: 56
End: 2021-05-04

## 2021-05-04 PROBLEM — 389026000: Status: ACTIVE | Noted: 2021-03-12

## 2021-05-07 ENCOUNTER — TELEPHONE ENCOUNTER (OUTPATIENT)
Dept: URBAN - METROPOLITAN AREA CLINIC 54 | Facility: CLINIC | Age: 56
End: 2021-05-07

## 2021-05-07 RX ORDER — CIPROFLOXACIN HYDROCHLORIDE 500 MG/1
1 TABLET TABLET, FILM COATED ORAL QD
Qty: 30 | Refills: 3

## 2021-05-11 ENCOUNTER — TELEPHONE ENCOUNTER (OUTPATIENT)
Dept: URBAN - METROPOLITAN AREA CLINIC 54 | Facility: CLINIC | Age: 56
End: 2021-05-11

## 2021-05-11 RX ORDER — SPIRONOLACTONE 100 MG/1
1 TABLET TABLET, FILM COATED ORAL BID
Qty: 60 TABLET | Refills: 6

## 2021-05-18 ENCOUNTER — LAB OUTSIDE AN ENCOUNTER (OUTPATIENT)
Dept: URBAN - METROPOLITAN AREA CLINIC 92 | Facility: CLINIC | Age: 56
End: 2021-05-18

## 2021-06-01 ENCOUNTER — LAB OUTSIDE AN ENCOUNTER (OUTPATIENT)
Dept: URBAN - METROPOLITAN AREA CLINIC 92 | Facility: CLINIC | Age: 56
End: 2021-06-01

## 2021-06-15 ENCOUNTER — LAB OUTSIDE AN ENCOUNTER (OUTPATIENT)
Dept: URBAN - METROPOLITAN AREA CLINIC 92 | Facility: CLINIC | Age: 56
End: 2021-06-15

## 2021-06-24 ENCOUNTER — TELEPHONE ENCOUNTER (OUTPATIENT)
Dept: URBAN - METROPOLITAN AREA CLINIC 54 | Facility: CLINIC | Age: 56
End: 2021-06-24

## 2021-06-24 RX ORDER — SPIRONOLACTONE 100 MG/1
1 TABLET TABLET, FILM COATED ORAL BID
Qty: 180 TABLET | Refills: 3

## 2021-06-29 ENCOUNTER — LAB OUTSIDE AN ENCOUNTER (OUTPATIENT)
Dept: URBAN - METROPOLITAN AREA CLINIC 92 | Facility: CLINIC | Age: 56
End: 2021-06-29

## 2021-07-13 ENCOUNTER — LAB OUTSIDE AN ENCOUNTER (OUTPATIENT)
Dept: URBAN - METROPOLITAN AREA CLINIC 92 | Facility: CLINIC | Age: 56
End: 2021-07-13

## 2021-07-27 ENCOUNTER — LAB OUTSIDE AN ENCOUNTER (OUTPATIENT)
Dept: URBAN - METROPOLITAN AREA CLINIC 92 | Facility: CLINIC | Age: 56
End: 2021-07-27

## 2021-09-14 ENCOUNTER — TELEPHONE ENCOUNTER (OUTPATIENT)
Dept: URBAN - NONMETROPOLITAN AREA CLINIC 4 | Facility: CLINIC | Age: 56
End: 2021-09-14

## 2021-09-14 ENCOUNTER — TELEPHONE ENCOUNTER (OUTPATIENT)
Dept: URBAN - METROPOLITAN AREA CLINIC 54 | Facility: CLINIC | Age: 56
End: 2021-09-14

## 2021-09-14 RX ORDER — FUROSEMIDE 40 MG/1
1 TABLET TABLET ORAL BID
Qty: 60 TABLET | Refills: 2

## 2021-09-22 ENCOUNTER — TELEPHONE ENCOUNTER (OUTPATIENT)
Dept: URBAN - METROPOLITAN AREA CLINIC 54 | Facility: CLINIC | Age: 56
End: 2021-09-22

## 2021-09-22 RX ORDER — FUROSEMIDE 40 MG/1
1 TABLET TABLET ORAL BID
Qty: 180 TABLET | Refills: 3

## 2021-09-22 RX ORDER — SPIRONOLACTONE 100 MG/1
1 TABLET TABLET, FILM COATED ORAL BID
Qty: 180 TABLET | Refills: 3
End: 2022-09-17

## 2021-10-13 ENCOUNTER — OFFICE VISIT (OUTPATIENT)
Dept: URBAN - NONMETROPOLITAN AREA CLINIC 4 | Facility: CLINIC | Age: 56
End: 2021-10-13
Payer: COMMERCIAL

## 2021-10-13 ENCOUNTER — TELEPHONE ENCOUNTER (OUTPATIENT)
Dept: URBAN - METROPOLITAN AREA CLINIC 54 | Facility: CLINIC | Age: 56
End: 2021-10-13

## 2021-10-13 VITALS
DIASTOLIC BLOOD PRESSURE: 55 MMHG | TEMPERATURE: 97.9 F | SYSTOLIC BLOOD PRESSURE: 109 MMHG | BODY MASS INDEX: 20.72 KG/M2 | HEART RATE: 96 BPM | HEIGHT: 71 IN | WEIGHT: 148 LBS

## 2021-10-13 DIAGNOSIS — E43 SEVERE PROTEIN-CALORIE MALNUTRITION: ICD-10-CM

## 2021-10-13 DIAGNOSIS — F10.20 UNCOMPLICATED ALCOHOL DEPENDENCE: ICD-10-CM

## 2021-10-13 DIAGNOSIS — K70.31 ALCOHOLIC CIRRHOSIS OF LIVER WITH ASCITES: ICD-10-CM

## 2021-10-13 DIAGNOSIS — K31.89 OTHER DISEASES OF STOMACH AND DUODENUM: ICD-10-CM

## 2021-10-13 DIAGNOSIS — K22.719 BARRETT'S ESOPHAGUS WITH DYSPLASIA: ICD-10-CM

## 2021-10-13 DIAGNOSIS — K76.6 PORTAL HYPERTENSION: ICD-10-CM

## 2021-10-13 DIAGNOSIS — I85.10 SECONDARY ESOPHAGEAL VARICES WITHOUT BLEEDING: ICD-10-CM

## 2021-10-13 PROCEDURE — 99214 OFFICE O/P EST MOD 30 MIN: CPT | Performed by: INTERNAL MEDICINE

## 2021-10-13 RX ORDER — CIPROFLOXACIN HYDROCHLORIDE 500 MG/1
1 TABLET TABLET, FILM COATED ORAL QD
Qty: 30 | Refills: 3 | Status: DISCONTINUED | COMMUNITY

## 2021-10-13 RX ORDER — FUROSEMIDE 40 MG/1
1 TABLET TABLET ORAL BID
Qty: 180 TABLET | Refills: 3 | Status: ACTIVE | COMMUNITY

## 2021-10-13 RX ORDER — SIMVASTATIN 40 MG/1
TABLET, FILM COATED ORAL
Qty: 0 | Refills: 0 | Status: DISCONTINUED | COMMUNITY

## 2021-10-13 RX ORDER — PANTOPRAZOLE SODIUM 40 MG/1
1 TABLET TABLET, DELAYED RELEASE ORAL ONCE A DAY
Qty: 90 TABLET | Refills: 2 | Status: ACTIVE | COMMUNITY

## 2021-10-13 RX ORDER — EZETIMIBE 10 MG/1
TAKE 1 TABLET (10 MG) BY ORAL ROUTE ONCE DAILY TABLET ORAL 1
Qty: 0 | Refills: 0 | Status: DISCONTINUED | COMMUNITY

## 2021-10-13 RX ORDER — ONDANSETRON HYDROCHLORIDE 8 MG/1
1 TABLET AS NEEDED TABLET, FILM COATED ORAL ONCE A DAY
Qty: 30 TABLET | Refills: 2 | Status: DISCONTINUED | COMMUNITY

## 2021-10-13 RX ORDER — SPIRONOLACTONE 100 MG/1
1 TABLET TABLET, FILM COATED ORAL BID
Qty: 180 TABLET | Refills: 3 | Status: ACTIVE | COMMUNITY

## 2021-10-13 RX ORDER — ICOSAPENT ETHYL 1000 MG/1
2 CAPSULES WITH MEALS CAPSULE ORAL TWICE A DAY
Status: ACTIVE | COMMUNITY

## 2021-10-13 RX ORDER — AMLODIPINE BESYLATE 10 MG/1
TAKE 1 TABLET (10 MG) BY ORAL ROUTE ONCE DAILY TABLET ORAL 1
Qty: 0 | Refills: 0 | Status: DISCONTINUED | COMMUNITY

## 2021-10-13 NOTE — HPI-TODAY'S VISIT:
hx of etoh cirrhosis present for f/uvisit. Pt reports nausea since this morning. pt withhx of dm/htn.dyslipidemia and SBP. UTD on HCC screening and EGD surveillance with EGD 12/2020 small varices and HCC surv with CT scan in Nov 2020 neg for lesions., On Cipro daily. Last LVP Dec 30 2020 with 9..6 L removed. Secondary hepatic w/u neg. Continues to drink. Pt with hx of etoh cirrhosis, Pt reports that he is due for colonoscopy.  Pt with indigestion gas tingling and burning. Pt reports this is similar to pain with Barretts and ulcers.  Pt report that the back pain is paraspinal in nature. c/o nausea for 2 months.  Pt reports that he is taking laxatives.  Pt reports that he has black stools. Is taking celecoxib.  Pt reports that he continues to smoke and drink.  Pt last paracentesis with 5.7 liters removed , +hx of SBP   1/19/21: Pt RTC for f/u. He had US para on 1/12 with 8400 mL removed. Fluid studies not c/w SBP. MELD 21. He remains on aldactone and lasix, but only taking one of them once a day. He denies any abdominal pain or reaccumulation of ascites. Last alcoholic drink on 1/11/21. He started taking a Florajen probiotic recently, which has been helping with his digestion. His last cscope was 12/20/2018, due for surveillance in 2021.  Follow Up 1/20/21: Patient presents for liver related opinion. He carries a diagnosis of ETOH cirrhosis with portal hypertension requiring LVPs. His last drink was  1/11/21. He smokes 1 ppd x 50 years. He had cataract surgery Nov 2020. He has never been evaluated for OLT. He works as a  and involves travel. He lives with his girlfriend in [place]. His diaghter is a NP in [place].  Follow Up 4/21/21: Pateint presents for routine follow up. He c/o LUQ dull ache which improves with LVP. His freqency is 2-4 weeks. He A200 and L80 with no results. MELD is 7 but MELD-Na 13 on recent labs. He is consuming on average 5 beers per week. Remains a smoker. No signs of PSE or GIB.  Follow Up 10/13/21: Patient presents for routine follow up. He has lost 90 lbs over past year and appear quite malnourished. He remains dependent on LVP's (8-10 L's) Q 1-2 weeks. MELD-Na is 14. No signs of PSE or GIB. He is on A200 and L80. He reports compliance with Na restriction. He claims to be abstienent for 161 days (5/6/21) without any formal counseling or rehab. Remains a smoker. His GF retired and is currently living in [place].

## 2021-10-13 NOTE — PHYSICAL EXAM CONSTITUTIONAL:
chronically ill appearing, malnourished , in no acute distress , ambulating without difficulty , normal communication ability

## 2021-10-14 LAB
A/G RATIO: 1.5
AFP, SERUM, TUMOR MARKER: 4.9
ALBUMIN: 4.1
ALKALINE PHOSPHATASE: 150
ALT (SGPT): 17
AST (SGOT): 24
BASO (ABSOLUTE): 0
BASOS: 0
BILIRUBIN, TOTAL: 0.3
BUN/CREATININE RATIO: 29
BUN: 42
CALCIUM: 9.9
CARBON DIOXIDE, TOTAL: 22
CHLORIDE: 95
CREATININE: 1.44
EGFR IF AFRICN AM: 62
EGFR IF NONAFRICN AM: 54
EOS (ABSOLUTE): 0.1
EOS: 1
GLOBULIN, TOTAL: 2.8
GLUCOSE: 96
HEMATOCRIT: 40
HEMATOLOGY COMMENTS:: (no result)
HEMOGLOBIN: 13.1
IMMATURE CELLS: (no result)
IMMATURE GRANS (ABS): 0
IMMATURE GRANULOCYTES: 1
INR: 1
LYMPHS (ABSOLUTE): 1.2
LYMPHS: 14
MCH: 32.7
MCHC: 32.8
MCV: 100
MONOCYTES(ABSOLUTE): 0.6
MONOCYTES: 7
NEUTROPHILS (ABSOLUTE): 6.7
NEUTROPHILS: 77
NRBC: (no result)
PLATELETS: 224
POTASSIUM: 5.1
PROTEIN, TOTAL: 6.9
PROTHROMBIN TIME: 10.9
RBC: 4.01
RDW: 12.5
SODIUM: 131
WBC: 8.6

## 2021-10-15 ENCOUNTER — WEB ENCOUNTER (OUTPATIENT)
Dept: URBAN - NONMETROPOLITAN AREA CLINIC 4 | Facility: CLINIC | Age: 56
End: 2021-10-15

## 2021-10-18 ENCOUNTER — WEB ENCOUNTER (OUTPATIENT)
Dept: URBAN - NONMETROPOLITAN AREA CLINIC 4 | Facility: CLINIC | Age: 56
End: 2021-10-18

## 2021-10-22 ENCOUNTER — WEB ENCOUNTER (OUTPATIENT)
Dept: URBAN - NONMETROPOLITAN AREA CLINIC 4 | Facility: CLINIC | Age: 56
End: 2021-10-22

## 2021-10-27 ENCOUNTER — TELEPHONE ENCOUNTER (OUTPATIENT)
Dept: URBAN - METROPOLITAN AREA CLINIC 92 | Facility: CLINIC | Age: 56
End: 2021-10-27

## 2021-12-13 ENCOUNTER — TELEPHONE ENCOUNTER (OUTPATIENT)
Dept: URBAN - NONMETROPOLITAN AREA CLINIC 4 | Facility: CLINIC | Age: 56
End: 2021-12-13

## 2021-12-23 ENCOUNTER — OFFICE VISIT (OUTPATIENT)
Dept: URBAN - NONMETROPOLITAN AREA CLINIC 4 | Facility: CLINIC | Age: 56
End: 2021-12-23
Payer: COMMERCIAL

## 2021-12-23 VITALS
TEMPERATURE: 98.1 F | BODY MASS INDEX: 24.02 KG/M2 | HEIGHT: 71 IN | WEIGHT: 171.6 LBS | HEART RATE: 96 BPM | DIASTOLIC BLOOD PRESSURE: 60 MMHG | SYSTOLIC BLOOD PRESSURE: 120 MMHG

## 2021-12-23 DIAGNOSIS — K70.31 ALCOHOLIC CIRRHOSIS OF LIVER WITH ASCITES: ICD-10-CM

## 2021-12-23 DIAGNOSIS — I85.10 SECONDARY ESOPHAGEAL VARICES WITHOUT BLEEDING: ICD-10-CM

## 2021-12-23 DIAGNOSIS — K31.89 OTHER DISEASES OF STOMACH AND DUODENUM: ICD-10-CM

## 2021-12-23 DIAGNOSIS — K76.6 PORTAL HYPERTENSION: ICD-10-CM

## 2021-12-23 PROCEDURE — 76700 US EXAM ABDOM COMPLETE: CPT | Performed by: REGISTERED NURSE

## 2021-12-23 PROCEDURE — 99215 OFFICE O/P EST HI 40 MIN: CPT | Performed by: REGISTERED NURSE

## 2021-12-23 PROCEDURE — 76705 ECHO EXAM OF ABDOMEN: CPT | Performed by: REGISTERED NURSE

## 2021-12-23 RX ORDER — SPIRONOLACTONE 100 MG/1
1 TABLET TABLET, FILM COATED ORAL BID
Qty: 180 TABLET | Refills: 3 | Status: ACTIVE | COMMUNITY

## 2021-12-23 RX ORDER — PANTOPRAZOLE SODIUM 40 MG/1
1 TABLET TABLET, DELAYED RELEASE ORAL ONCE A DAY
Qty: 90 TABLET | Refills: 2 | Status: ACTIVE | COMMUNITY

## 2021-12-23 RX ORDER — ICOSAPENT ETHYL 1000 MG/1
2 CAPSULES WITH MEALS CAPSULE ORAL TWICE A DAY
Status: ACTIVE | COMMUNITY

## 2021-12-23 RX ORDER — FUROSEMIDE 40 MG/1
1 TABLET TABLET ORAL BID
Qty: 180 TABLET | Refills: 3 | Status: ACTIVE | COMMUNITY

## 2021-12-23 RX ORDER — ZOLPIDEM TARTRATE 5 MG/1
1 TABLET AT BEDTIME TABLET, FILM COATED ORAL
Refills: 1 | OUTPATIENT
Start: 2021-12-23

## 2021-12-23 NOTE — PHYSICAL EXAM GASTROINTESTINAL
Abdomen , soft, nontender, mildly distended with ascites , no guarding or rigidity , no masses palpable , normal bowel sounds , Liver and Spleen , no hepatosplenomegaly palpable, liver nontender , spleen not palpable

## 2021-12-23 NOTE — PHYSICAL EXAM HENT:
Head,  normocephalic,  atraumatic,  Face,  Face within normal limits,  Ears,  External ears within normal limits,  Nose/Nasopharynx,  External nose  normal appearance,  nares patent,  no nasal discharge,  Mouth and Throat,  Oral cavity appearance normal,  Breath odor normal,  Lips,  Appearance normal
S/P shoulder surgery  left shoulder arthroscopy

## 2021-12-23 NOTE — HPI-TODAY'S VISIT:
hx of etoh cirrhosis present for f/uvisit. Pt reports nausea since this morning. pt withhx of dm/htn.dyslipidemia and SBP. UTD on HCC screening and EGD surveillance with EGD 12/2020 small varices and HCC surv with CT scan in Nov 2020 neg for lesions., On Cipro daily. Last LVP Dec 30 2020 with 9..6 L removed. Secondary hepatic w/u neg. Continues to drink. Pt with hx of etoh cirrhosis, Pt reports that he is due for colonoscopy.  Pt with indigestion gas tingling and burning. Pt reports this is similar to pain with Barretts and ulcers.  Pt report that the back pain is paraspinal in nature. c/o nausea for 2 months.  Pt reports that he is taking laxatives.  Pt reports that he has black stools. Is taking celecoxib.  Pt reports that he continues to smoke and drink.  Pt last paracentesis with 5.7 liters removed , +hx of SBP   1/19/21: Pt RTC for f/u. He had US para on 1/12 with 8400 mL removed. Fluid studies not c/w SBP. MELD 21. He remains on aldactone and lasix, but only taking one of them once a day. He denies any abdominal pain or reaccumulation of ascites. Last alcoholic drink on 1/11/21. He started taking a Florajen probiotic recently, which has been helping with his digestion. His last cscope was 12/20/2018, due for surveillance in 2021.  Follow Up 1/20/21: Patient presents for liver related opinion. He carries a diagnosis of ETOH cirrhosis with portal hypertension requiring LVPs. His last drink was  1/11/21. He smokes 1 ppd x 50 years. He had cataract surgery Nov 2020. He has never been evaluated for OLT. He works as a  and involves travel. He lives with his girlfriend in [place]. His diaghter is a NP in [place].  Follow Up 4/21/21: Pateint presents for routine follow up. He c/o LUQ dull ache which improves with LVP. His freqency is 2-4 weeks. He A200 and L80 with no results. MELD is 7 but MELD-Na 13 on recent labs. He is consuming on average 5 beers per week. Remains a smoker. No signs of PSE or GIB.  Follow Up 10/13/21: Patient presents for routine follow up. He has lost 90 lbs over past year and appear quite malnourished. He remains dependent on LVP's (8-10 L's) Q 1-2 weeks. MELD-Na is 14. No signs of PSE or GIB. He is on A200 and L80. He reports compliance with Na restriction. He claims to be abstienent for 161 days (5/6/21) without any formal counseling or rehab. Remains a smoker. His GF retired and is currently living in [place].  12/23/21: Pt RTC for f/u. He is s/p TIPS 12/10/21. He is s/p LVP 12/22 with 10 L fluid removal. He was noted to have a small amount of residual ascites. He is scheduled for a repeat paracentesis next week. He is having trouble sleeping. He also reports muscle cramping after he get para as well as numbness and tingling in feet. He is currently taking lactulose 15 mL TID and Xifaxan 550 mg BID. He is having worsening acid reflux and heartburn. He stopped taking Protonix. Has had a few episodes of diarrhea with urgency. Denies any overt GI bleeding, jaundice or PSE.  He is 232 days sober.

## 2021-12-26 ENCOUNTER — WEB ENCOUNTER (OUTPATIENT)
Dept: URBAN - NONMETROPOLITAN AREA CLINIC 4 | Facility: CLINIC | Age: 56
End: 2021-12-26

## 2021-12-29 LAB
A/G RATIO: 1.8
ALBUMIN: 3.4
ALKALINE PHOSPHATASE: 198
ALT (SGPT): 52
AST (SGOT): 42
BILIRUBIN, TOTAL: 0.4
BUN/CREATININE RATIO: 19
BUN: 16
CALCIUM: 8.6
CARBON DIOXIDE, TOTAL: 20
CHLORIDE: 101
CREATININE: 0.86
EGFR IF AFRICN AM: 111
EGFR IF NONAFRICN AM: 96
FOLATE (FOLIC ACID), SERUM: >20
GLOBULIN, TOTAL: 1.9
GLUCOSE: 95
HEMATOCRIT: 39.4
HEMOGLOBIN: 13.4
INR: 1
MAGNESIUM: 1.9
MCH: 32.1
MCHC: 34
MCV: 94
NRBC: (no result)
PLATELETS: 191
POTASSIUM: 5.6
PROTEIN, TOTAL: 5.3
PROTHROMBIN TIME: 10.9
RBC: 4.18
RDW: 12.2
SODIUM: 132
VITAMIN B12: 1547
WBC: 12.3
ZINC, PLASMA OR SERUM: 55

## 2022-01-03 ENCOUNTER — TELEPHONE ENCOUNTER (OUTPATIENT)
Dept: URBAN - NONMETROPOLITAN AREA CLINIC 4 | Facility: CLINIC | Age: 57
End: 2022-01-03

## 2022-01-04 ENCOUNTER — TELEPHONE ENCOUNTER (OUTPATIENT)
Dept: URBAN - NONMETROPOLITAN AREA CLINIC 4 | Facility: CLINIC | Age: 57
End: 2022-01-04

## 2022-01-05 ENCOUNTER — TELEPHONE ENCOUNTER (OUTPATIENT)
Dept: URBAN - METROPOLITAN AREA CLINIC 54 | Facility: CLINIC | Age: 57
End: 2022-01-05

## 2022-02-09 ENCOUNTER — OFFICE VISIT (OUTPATIENT)
Dept: URBAN - NONMETROPOLITAN AREA CLINIC 4 | Facility: CLINIC | Age: 57
End: 2022-02-09
Payer: COMMERCIAL

## 2022-02-09 DIAGNOSIS — I85.10 SECONDARY ESOPHAGEAL VARICES WITHOUT BLEEDING: ICD-10-CM

## 2022-02-09 DIAGNOSIS — K70.31 ALCOHOLIC CIRRHOSIS OF LIVER WITH ASCITES: ICD-10-CM

## 2022-02-09 DIAGNOSIS — K31.89 OTHER DISEASES OF STOMACH AND DUODENUM: ICD-10-CM

## 2022-02-09 DIAGNOSIS — K76.6 PORTAL HYPERTENSION: ICD-10-CM

## 2022-02-09 PROCEDURE — 99213 OFFICE O/P EST LOW 20 MIN: CPT | Performed by: INTERNAL MEDICINE

## 2022-02-09 RX ORDER — ICOSAPENT ETHYL 1000 MG/1
2 CAPSULES WITH MEALS CAPSULE ORAL TWICE A DAY
Status: ACTIVE | COMMUNITY

## 2022-02-09 RX ORDER — PANTOPRAZOLE SODIUM 40 MG/1
1 TABLET TABLET, DELAYED RELEASE ORAL ONCE A DAY
Qty: 90 TABLET | Refills: 2 | Status: ACTIVE | COMMUNITY

## 2022-02-09 RX ORDER — ZOLPIDEM TARTRATE 5 MG/1
1 TABLET AT BEDTIME TABLET, FILM COATED ORAL
Refills: 1 | Status: ACTIVE | COMMUNITY
Start: 2021-12-23

## 2022-02-09 RX ORDER — SPIRONOLACTONE 100 MG/1
1 TABLET TABLET, FILM COATED ORAL BID
Qty: 180 TABLET | Refills: 3 | Status: ACTIVE | COMMUNITY

## 2022-02-09 RX ORDER — FUROSEMIDE 40 MG/1
1 TABLET TABLET ORAL BID
Qty: 180 TABLET | Refills: 3 | Status: ACTIVE | COMMUNITY

## 2022-02-09 RX ORDER — SPIRONOLACTONE 100 MG/1
1 TABLET TABLET, FILM COATED ORAL BID
Qty: 180 TABLET | Refills: 3 | OUTPATIENT
Start: 2022-02-09 | End: 2023-02-04

## 2022-02-09 RX ORDER — FUROSEMIDE 40 MG/1
1 TABLET TABLET ORAL BID
Qty: 180 TABLET | Refills: 3 | OUTPATIENT
Start: 2022-02-09

## 2022-02-09 NOTE — HPI-TODAY'S VISIT:
hx of etoh cirrhosis present for f/uvisit. Pt reports nausea since this morning. pt withhx of dm/htn.dyslipidemia and SBP. UTD on HCC screening and EGD surveillance with EGD 12/2020 small varices and HCC surv with CT scan in Nov 2020 neg for lesions., On Cipro daily. Last LVP Dec 30 2020 with 9..6 L removed. Secondary hepatic w/u neg. Continues to drink. Pt with hx of etoh cirrhosis, Pt reports that he is due for colonoscopy.  Pt with indigestion gas tingling and burning. Pt reports this is similar to pain with Barretts and ulcers.  Pt report that the back pain is paraspinal in nature. c/o nausea for 2 months.  Pt reports that he is taking laxatives.  Pt reports that he has black stools. Is taking celecoxib.  Pt reports that he continues to smoke and drink.  Pt last paracentesis with 5.7 liters removed , +hx of SBP   1/19/21: Pt RTC for f/u. He had US para on 1/12 with 8400 mL removed. Fluid studies not c/w SBP. MELD 21. He remains on aldactone and lasix, but only taking one of them once a day. He denies any abdominal pain or reaccumulation of ascites. Last alcoholic drink on 1/11/21. He started taking a Florajen probiotic recently, which has been helping with his digestion. His last cscope was 12/20/2018, due for surveillance in 2021.  Follow Up 1/20/21: Patient presents for liver related opinion. He carries a diagnosis of ETOH cirrhosis with portal hypertension requiring LVPs. His last drink was  1/11/21. He smokes 1 ppd x 50 years. He had cataract surgery Nov 2020. He has never been evaluated for OLT. He works as a  and involves travel. He lives with his girlfriend in [place]. His diaghter is a NP in [place].  Follow Up 4/21/21: Pateint presents for routine follow up. He c/o LUQ dull ache which improves with LVP. His freqency is 2-4 weeks. He A200 and L80 with no results. MELD is 7 but MELD-Na 13 on recent labs. He is consuming on average 5 beers per week. Remains a smoker. No signs of PSE or GIB.  Follow Up 10/13/21: Patient presents for routine follow up. He has lost 90 lbs over past year and appear quite malnourished. He remains dependent on LVP's (8-10 L's) Q 1-2 weeks. MELD-Na is 14. No signs of PSE or GIB. He is on A200 and L80. He reports compliance with Na restriction. He claims to be abstienent for 161 days (5/6/21) without any formal counseling or rehab. Remains a smoker. His GF retired and is currently living in [place].  12/23/21: Pt RTC for f/u. He is s/p TIPS 12/10/21. He is s/p LVP 12/22 with 10 L fluid removal. He was noted to have a small amount of residual ascites. He is scheduled for a repeat paracentesis next week. He is having trouble sleeping. He also reports muscle cramping after he get para as well as numbness and tingling in feet. He is currently taking lactulose 15 mL TID and Xifaxan 550 mg BID. He is having worsening acid reflux and heartburn. He stopped taking Protonix. Has had a few episodes of diarrhea with urgency. Denies any overt GI bleeding, jaundice or PSE.  He is 232 days sober.  Follow Up 2/9/22: Patient presents for routine follow up. His therapeutic paracentesis frequency and volume has improved. He was in ER on 1/4/21 for scrotal edema. He has not started SARP counseling.  He is due for TIPS eval in 1 month.

## 2022-02-09 NOTE — PHYSICAL EXAM GASTROINTESTINAL
Abdomen , soft, nontender, moderately distended with ascites , no guarding or rigidity , no masses palpable , normal bowel sounds , Liver and Spleen , no hepatosplenomegaly palpable, liver nontender , spleen not palpable

## 2022-02-10 LAB
A/G RATIO: 1.4
AFP, SERUM, TUMOR MARKER: 4.2
ALBUMIN: 3.4
ALKALINE PHOSPHATASE: 133
ALT (SGPT): 21
AST (SGOT): 29
BASO (ABSOLUTE): 0
BASOS: 0
BILIRUBIN, TOTAL: 0.7
BUN/CREATININE RATIO: 18
BUN: 21
CALCIUM: 9
CARBON DIOXIDE, TOTAL: 17
CHLORIDE: 104
CREATININE: 1.15
EGFR IF AFRICN AM: 81
EGFR IF NONAFRICN AM: 70
EOS (ABSOLUTE): 0.2
EOS: 2
GLOBULIN, TOTAL: 2.4
GLUCOSE: 99
HEMATOCRIT: 34.6
HEMATOLOGY COMMENTS:: (no result)
HEMOGLOBIN: 11.8
IMMATURE CELLS: (no result)
IMMATURE GRANS (ABS): 0
IMMATURE GRANULOCYTES: 0
INR: 1.1
LYMPHS (ABSOLUTE): 1.2
LYMPHS: 14
MCH: 32
MCHC: 34.1
MCV: 94
MONOCYTES(ABSOLUTE): 0.9
MONOCYTES: 12
NEUTROPHILS (ABSOLUTE): 5.7
NEUTROPHILS: 72
NRBC: (no result)
PLATELETS: 155
POTASSIUM: 4.6
PROTEIN, TOTAL: 5.8
PROTHROMBIN TIME: 11.3
RBC: 3.69
RDW: 13.7
SODIUM: 135
WBC: 8

## 2022-02-22 ENCOUNTER — TELEPHONE ENCOUNTER (OUTPATIENT)
Dept: URBAN - METROPOLITAN AREA CLINIC 92 | Facility: CLINIC | Age: 57
End: 2022-02-22

## 2022-02-22 RX ORDER — ZOLPIDEM TARTRATE 5 MG/1
1 TABLET AT BEDTIME TABLET, FILM COATED ORAL
Refills: 1 | Status: ACTIVE | COMMUNITY
Start: 2021-12-23

## 2022-02-22 RX ORDER — SPIRONOLACTONE 100 MG/1
1 TABLET TABLET, FILM COATED ORAL BID
Qty: 180 TABLET | Refills: 3 | Status: ACTIVE | COMMUNITY

## 2022-02-22 RX ORDER — FUROSEMIDE 40 MG/1
1 TABLET TABLET ORAL BID
Qty: 180 TABLET | Refills: 3 | Status: ACTIVE | COMMUNITY
Start: 2022-02-09

## 2022-02-22 RX ORDER — FUROSEMIDE 40 MG/1
1 TABLET TABLET ORAL BID
Qty: 180 TABLET | Refills: 3 | Status: ACTIVE | COMMUNITY

## 2022-02-22 RX ORDER — LACTULOSE 10 G/15ML
15 ML SOLUTION ORAL BID
Qty: 2700 ML | Refills: 3 | OUTPATIENT
Start: 2022-02-23 | End: 2023-02-18

## 2022-02-22 RX ORDER — ICOSAPENT ETHYL 1000 MG/1
2 CAPSULES WITH MEALS CAPSULE ORAL TWICE A DAY
Status: ACTIVE | COMMUNITY

## 2022-02-22 RX ORDER — PANTOPRAZOLE SODIUM 40 MG/1
1 TABLET TABLET, DELAYED RELEASE ORAL ONCE A DAY
Qty: 90 TABLET | Refills: 2 | Status: ACTIVE | COMMUNITY

## 2022-02-22 RX ORDER — SPIRONOLACTONE 100 MG/1
1 TABLET TABLET, FILM COATED ORAL BID
Qty: 180 TABLET | Refills: 3 | Status: ACTIVE | COMMUNITY
Start: 2022-02-09 | End: 2023-02-04

## 2022-03-03 ENCOUNTER — TELEPHONE ENCOUNTER (OUTPATIENT)
Dept: URBAN - METROPOLITAN AREA CLINIC 92 | Facility: CLINIC | Age: 57
End: 2022-03-03

## 2022-03-10 ENCOUNTER — TELEPHONE ENCOUNTER (OUTPATIENT)
Dept: URBAN - METROPOLITAN AREA CLINIC 92 | Facility: CLINIC | Age: 57
End: 2022-03-10

## 2022-03-10 RX ORDER — ZOLPIDEM TARTRATE 5 MG/1
1 TABLET AT BEDTIME TABLET, FILM COATED ORAL
Refills: 1
Start: 2021-12-23

## 2022-04-13 ENCOUNTER — OFFICE VISIT (OUTPATIENT)
Dept: URBAN - NONMETROPOLITAN AREA CLINIC 4 | Facility: CLINIC | Age: 57
End: 2022-04-13
Payer: COMMERCIAL

## 2022-04-13 ENCOUNTER — TELEPHONE ENCOUNTER (OUTPATIENT)
Dept: URBAN - METROPOLITAN AREA CLINIC 92 | Facility: CLINIC | Age: 57
End: 2022-04-13

## 2022-04-13 DIAGNOSIS — E43 SEVERE PROTEIN-CALORIE MALNUTRITION: ICD-10-CM

## 2022-04-13 DIAGNOSIS — K76.6 PORTAL HYPERTENSION: ICD-10-CM

## 2022-04-13 DIAGNOSIS — I85.10 SECONDARY ESOPHAGEAL VARICES WITHOUT BLEEDING: ICD-10-CM

## 2022-04-13 DIAGNOSIS — K70.31 ALCOHOLIC CIRRHOSIS OF LIVER WITH ASCITES: ICD-10-CM

## 2022-04-13 PROBLEM — 1082751000119109: Status: ACTIVE | Noted: 2021-10-25

## 2022-04-13 PROCEDURE — 99213 OFFICE O/P EST LOW 20 MIN: CPT | Performed by: INTERNAL MEDICINE

## 2022-04-13 RX ORDER — SPIRONOLACTONE 100 MG/1
1 TABLET TABLET, FILM COATED ORAL BID
Qty: 180 TABLET | Refills: 3 | Status: ACTIVE | COMMUNITY

## 2022-04-13 RX ORDER — PANTOPRAZOLE SODIUM 40 MG/1
1 TABLET TABLET, DELAYED RELEASE ORAL ONCE A DAY
Qty: 90 TABLET | Refills: 2 | Status: ACTIVE | COMMUNITY

## 2022-04-13 RX ORDER — ZOLPIDEM TARTRATE 5 MG/1
1 TABLET AT BEDTIME TABLET, FILM COATED ORAL
Refills: 1 | Status: ACTIVE | COMMUNITY
Start: 2021-12-23

## 2022-04-13 RX ORDER — SPIRONOLACTONE 100 MG/1
1 TABLET TABLET, FILM COATED ORAL BID
Qty: 180 TABLET | Refills: 3 | OUTPATIENT

## 2022-04-13 RX ORDER — FUROSEMIDE 40 MG/1
1 TABLET TABLET ORAL BID
Qty: 180 TABLET | Refills: 3 | Status: ACTIVE | COMMUNITY

## 2022-04-13 RX ORDER — FUROSEMIDE 40 MG/1
1 TABLET TABLET ORAL BID
Qty: 180 TABLET | Refills: 3 | OUTPATIENT

## 2022-04-13 RX ORDER — LACTULOSE 10 G/15ML
15 ML SOLUTION ORAL BID
Qty: 2700 ML | Refills: 3 | Status: ACTIVE | COMMUNITY
Start: 2022-02-23 | End: 2023-02-18

## 2022-04-13 RX ORDER — ICOSAPENT ETHYL 1000 MG/1
2 CAPSULES WITH MEALS CAPSULE ORAL TWICE A DAY
Status: ACTIVE | COMMUNITY

## 2022-04-13 RX ORDER — SPIRONOLACTONE 100 MG/1
1 TABLET TABLET, FILM COATED ORAL BID
Qty: 180 TABLET | Refills: 3 | Status: ACTIVE | COMMUNITY
Start: 2022-02-09 | End: 2023-02-04

## 2022-04-13 RX ORDER — FUROSEMIDE 40 MG/1
1 TABLET TABLET ORAL BID
Qty: 180 TABLET | Refills: 3 | Status: ACTIVE | COMMUNITY
Start: 2022-02-09

## 2022-04-13 RX ORDER — LACTULOSE 10 G/15ML
30 ML SOLUTION ORAL TWICE A DAY
Qty: 1800 ML | Refills: 3 | OUTPATIENT
Start: 2022-04-13 | End: 2022-08-11

## 2022-04-13 NOTE — HPI-TODAY'S VISIT:
hx of etoh cirrhosis present for f/uvisit. Pt reports nausea since this morning. pt withhx of dm/htn.dyslipidemia and SBP. UTD on HCC screening and EGD surveillance with EGD 12/2020 small varices and HCC surv with CT scan in Nov 2020 neg for lesions., On Cipro daily. Last LVP Dec 30 2020 with 9..6 L removed. Secondary hepatic w/u neg. Continues to drink. Pt with hx of etoh cirrhosis, Pt reports that he is due for colonoscopy.  Pt with indigestion gas tingling and burning. Pt reports this is similar to pain with Barretts and ulcers.  Pt report that the back pain is paraspinal in nature. c/o nausea for 2 months.  Pt reports that he is taking laxatives.  Pt reports that he has black stools. Is taking celecoxib.  Pt reports that he continues to smoke and drink.  Pt last paracentesis with 5.7 liters removed , +hx of SBP   1/19/21: Pt RTC for f/u. He had US para on 1/12 with 8400 mL removed. Fluid studies not c/w SBP. MELD 21. He remains on aldactone and lasix, but only taking one of them once a day. He denies any abdominal pain or reaccumulation of ascites. Last alcoholic drink on 1/11/21. He started taking a Florajen probiotic recently, which has been helping with his digestion. His last cscope was 12/20/2018, due for surveillance in 2021.  Follow Up 1/20/21: Patient presents for liver related opinion. He carries a diagnosis of ETOH cirrhosis with portal hypertension requiring LVPs. His last drink was  1/11/21. He smokes 1 ppd x 50 years. He had cataract surgery Nov 2020. He has never been evaluated for OLT. He works as a  and involves travel. He lives with his girlfriend in [place]. His diaghter is a NP in [place].  Follow Up 4/21/21: Pateint presents for routine follow up. He c/o LUQ dull ache which improves with LVP. His freqency is 2-4 weeks. He A200 and L80 with no results. MELD is 7 but MELD-Na 13 on recent labs. He is consuming on average 5 beers per week. Remains a smoker. No signs of PSE or GIB.  Follow Up 10/13/21: Patient presents for routine follow up. He has lost 90 lbs over past year and appear quite malnourished. He remains dependent on LVP's (8-10 L's) Q 1-2 weeks. MELD-Na is 14. No signs of PSE or GIB. He is on A200 and L80. He reports compliance with Na restriction. He claims to be abstienent for 161 days (5/6/21) without any formal counseling or rehab. Remains a smoker. His GF retired and is currently living in [place].  12/23/21: Pt RTC for f/u. He is s/p TIPS 12/10/21. He is s/p LVP 12/22 with 10 L fluid removal. He was noted to have a small amount of residual ascites. He is scheduled for a repeat paracentesis next week. He is having trouble sleeping. He also reports muscle cramping after he get para as well as numbness and tingling in feet. He is currently taking lactulose 15 mL TID and Xifaxan 550 mg BID. He is having worsening acid reflux and heartburn. He stopped taking Protonix. Has had a few episodes of diarrhea with urgency. Denies any overt GI bleeding, jaundice or PSE.  He is 232 days sober.  Follow Up 2/9/22: Patient presents for routine follow up. His therapeutic paracentesis frequency and volume has improved. He was in ER on 1/4/21 for scrotal edema. He has not started SARP counseling.  He is due for TIPS eval in 1 month.  Follow Up 4/13/22: Patient presents for routine follow up. He c/o fatigue, pruritus and cold feet. He was on L80 and A200 which he cut down himself. TIPS done   The hepatic vasculature is patent with unremarkable waveforms. The TIPS is patent. Flow velocity in the shunt ranges from a minimum of 103.0 cm/s to a maximum of 197.2 cm/s.   The liver retains a cirrhotic morphology. No focal lesion is seen on ultrasound. Mild splenomegaly is essentially stable. Mild ascites in the abdomen is somewhat improved.   There are a few small gallstones in the gallbladder which remains distended. There is no ultrasound evidence of cholecystitis, however. There is no dilatation of the biliary tree.  His last LVP was March 21 when 6.7 L was removed. His frequency has improved. He has not followed up for SARP counseling yet, His first appointment is on 4/26. He also c/o bilateral cramps.

## 2022-04-14 LAB
A/G RATIO: 1.7
ALBUMIN: 4
ALKALINE PHOSPHATASE: 111
ALT (SGPT): 22
AST (SGOT): 30
BASO (ABSOLUTE): 0
BASOS: 1
BILIRUBIN, TOTAL: 1
BUN/CREATININE RATIO: 16
BUN: 16
CALCIUM: 9.9
CARBON DIOXIDE, TOTAL: 21
CHLORIDE: 105
CREATININE: 0.99
EGFR: 89
EOS (ABSOLUTE): 0.2
EOS: 3
GLOBULIN, TOTAL: 2.4
GLUCOSE: 97
HEMATOCRIT: 34.9
HEMATOLOGY COMMENTS:: (no result)
HEMOGLOBIN: 11.8
IMMATURE CELLS: (no result)
IMMATURE GRANS (ABS): 0
IMMATURE GRANULOCYTES: 0
INR: 1.1
LYMPHS (ABSOLUTE): 1.1
LYMPHS: 18
MCH: 32.7
MCHC: 33.8
MCV: 97
MONOCYTES(ABSOLUTE): 0.8
MONOCYTES: 13
NEUTROPHILS (ABSOLUTE): 4.2
NEUTROPHILS: 65
NRBC: (no result)
PLATELETS: 145
POTASSIUM: 4.1
PROTEIN, TOTAL: 6.4
PROTHROMBIN TIME: 11.5
RBC: 3.61
RDW: 12.9
SODIUM: 139
WBC: 6.4

## 2022-05-02 ENCOUNTER — TELEPHONE ENCOUNTER (OUTPATIENT)
Dept: URBAN - NONMETROPOLITAN AREA CLINIC 4 | Facility: CLINIC | Age: 57
End: 2022-05-02

## 2022-05-06 ENCOUNTER — TELEPHONE ENCOUNTER (OUTPATIENT)
Dept: URBAN - METROPOLITAN AREA CLINIC 92 | Facility: CLINIC | Age: 57
End: 2022-05-06

## 2022-05-06 RX ORDER — LACTULOSE 10 G/15ML
30 ML SOLUTION ORAL TWICE A DAY
Qty: 1800 ML | Refills: 3
Start: 2022-04-13 | End: 2022-09-03

## 2022-05-16 ENCOUNTER — WEB ENCOUNTER (OUTPATIENT)
Dept: URBAN - NONMETROPOLITAN AREA CLINIC 4 | Facility: CLINIC | Age: 57
End: 2022-05-16

## 2022-05-17 ENCOUNTER — LAB OUTSIDE AN ENCOUNTER (OUTPATIENT)
Dept: URBAN - NONMETROPOLITAN AREA CLINIC 4 | Facility: CLINIC | Age: 57
End: 2022-05-17

## 2022-05-31 ENCOUNTER — LAB OUTSIDE AN ENCOUNTER (OUTPATIENT)
Dept: URBAN - NONMETROPOLITAN AREA CLINIC 4 | Facility: CLINIC | Age: 57
End: 2022-05-31

## 2022-06-08 ENCOUNTER — LAB OUTSIDE AN ENCOUNTER (OUTPATIENT)
Dept: URBAN - METROPOLITAN AREA CLINIC 54 | Facility: CLINIC | Age: 57
End: 2022-06-08

## 2022-06-08 ENCOUNTER — TELEPHONE ENCOUNTER (OUTPATIENT)
Dept: URBAN - METROPOLITAN AREA CLINIC 92 | Facility: CLINIC | Age: 57
End: 2022-06-08

## 2022-06-09 LAB
A/G RATIO: 1.6
ALBUMIN: 3.6
ALKALINE PHOSPHATASE: 100
ALT (SGPT): 12
APTT: 28
AST (SGOT): 19
BASO (ABSOLUTE): 0
BASOS: 0
BILIRUBIN, TOTAL: 0.6
BUN/CREATININE RATIO: 8
BUN: 8
CALCIUM: 8.6
CARBON DIOXIDE, TOTAL: 23
CHLORIDE: 105
CREATININE: 0.95
EGFR: 93
EOS (ABSOLUTE): 0.1
EOS: 1
GLOBULIN, TOTAL: 2.2
GLUCOSE: 114
HEMATOCRIT: 37.1
HEMATOLOGY COMMENTS:: (no result)
HEMOGLOBIN: 12.5
IMMATURE CELLS: (no result)
IMMATURE GRANS (ABS): 0
IMMATURE GRANULOCYTES: 0
INR: 1.1
LYMPHS (ABSOLUTE): 1.1
LYMPHS: 13
MCH: 33.2
MCHC: 33.7
MCV: 99
MONOCYTES(ABSOLUTE): 0.6
MONOCYTES: 7
NEUTROPHILS (ABSOLUTE): 6.4
NEUTROPHILS: 79
NRBC: (no result)
PLATELETS: 128
POTASSIUM: 3.5
PROTEIN, TOTAL: 5.8
PROTHROMBIN TIME: 11.9
RBC: 3.76
RDW: 13
SODIUM: 140
WBC: 8.1

## 2022-06-14 ENCOUNTER — LAB OUTSIDE AN ENCOUNTER (OUTPATIENT)
Dept: URBAN - NONMETROPOLITAN AREA CLINIC 4 | Facility: CLINIC | Age: 57
End: 2022-06-14

## 2022-06-28 ENCOUNTER — LAB OUTSIDE AN ENCOUNTER (OUTPATIENT)
Dept: URBAN - NONMETROPOLITAN AREA CLINIC 4 | Facility: CLINIC | Age: 57
End: 2022-06-28

## 2022-07-12 ENCOUNTER — LAB OUTSIDE AN ENCOUNTER (OUTPATIENT)
Dept: URBAN - NONMETROPOLITAN AREA CLINIC 4 | Facility: CLINIC | Age: 57
End: 2022-07-12

## 2022-07-26 ENCOUNTER — LAB OUTSIDE AN ENCOUNTER (OUTPATIENT)
Dept: URBAN - NONMETROPOLITAN AREA CLINIC 4 | Facility: CLINIC | Age: 57
End: 2022-07-26

## 2022-08-09 ENCOUNTER — LAB OUTSIDE AN ENCOUNTER (OUTPATIENT)
Dept: URBAN - NONMETROPOLITAN AREA CLINIC 4 | Facility: CLINIC | Age: 57
End: 2022-08-09

## 2022-08-23 ENCOUNTER — LAB OUTSIDE AN ENCOUNTER (OUTPATIENT)
Dept: URBAN - NONMETROPOLITAN AREA CLINIC 4 | Facility: CLINIC | Age: 57
End: 2022-08-23

## 2022-09-06 ENCOUNTER — LAB OUTSIDE AN ENCOUNTER (OUTPATIENT)
Dept: URBAN - NONMETROPOLITAN AREA CLINIC 4 | Facility: CLINIC | Age: 57
End: 2022-09-06

## 2022-09-19 ENCOUNTER — TELEPHONE ENCOUNTER (OUTPATIENT)
Dept: URBAN - METROPOLITAN AREA CLINIC 54 | Facility: CLINIC | Age: 57
End: 2022-09-19

## 2022-09-19 RX ORDER — LACTULOSE 10 G/15ML
15 ML SOLUTION ORAL BID
Qty: 2700 ML | Refills: 3
Start: 2022-02-23 | End: 2023-09-14

## 2022-09-20 ENCOUNTER — LAB OUTSIDE AN ENCOUNTER (OUTPATIENT)
Dept: URBAN - NONMETROPOLITAN AREA CLINIC 4 | Facility: CLINIC | Age: 57
End: 2022-09-20

## 2022-09-21 ENCOUNTER — TELEPHONE ENCOUNTER (OUTPATIENT)
Dept: URBAN - METROPOLITAN AREA CLINIC 54 | Facility: CLINIC | Age: 57
End: 2022-09-21

## 2022-09-21 RX ORDER — LACTULOSE 10 G/15ML
15 ML SOLUTION ORAL BID
Qty: 2700 ML | Refills: 3
Start: 2022-02-23 | End: 2023-09-16

## 2022-10-12 ENCOUNTER — OFFICE VISIT (OUTPATIENT)
Dept: URBAN - NONMETROPOLITAN AREA CLINIC 4 | Facility: CLINIC | Age: 57
End: 2022-10-12

## 2022-12-08 ENCOUNTER — WEB ENCOUNTER (OUTPATIENT)
Dept: URBAN - NONMETROPOLITAN AREA CLINIC 4 | Facility: CLINIC | Age: 57
End: 2022-12-08

## 2022-12-14 ENCOUNTER — OFFICE VISIT (OUTPATIENT)
Dept: URBAN - NONMETROPOLITAN AREA CLINIC 4 | Facility: CLINIC | Age: 57
End: 2022-12-14
Payer: COMMERCIAL

## 2022-12-14 VITALS
HEART RATE: 73 BPM | WEIGHT: 186.2 LBS | DIASTOLIC BLOOD PRESSURE: 60 MMHG | SYSTOLIC BLOOD PRESSURE: 115 MMHG | HEIGHT: 71 IN | TEMPERATURE: 98.8 F | BODY MASS INDEX: 26.07 KG/M2

## 2022-12-14 DIAGNOSIS — Z86.010 PERSONAL HISTORY OF COLONIC POLYPS: ICD-10-CM

## 2022-12-14 DIAGNOSIS — G47.00 INSOMNIA, UNSPECIFIED TYPE: ICD-10-CM

## 2022-12-14 DIAGNOSIS — Z72.0 TOBACCO ABUSE: ICD-10-CM

## 2022-12-14 DIAGNOSIS — E43 SEVERE PROTEIN-CALORIE MALNUTRITION: ICD-10-CM

## 2022-12-14 DIAGNOSIS — K76.6 PORTAL HYPERTENSION: ICD-10-CM

## 2022-12-14 DIAGNOSIS — K31.89 OTHER DISEASES OF STOMACH AND DUODENUM: ICD-10-CM

## 2022-12-14 DIAGNOSIS — K70.31 ALCOHOLIC CIRRHOSIS OF LIVER WITH ASCITES: ICD-10-CM

## 2022-12-14 DIAGNOSIS — K22.70 BARRETT'S ESOPHAGUS DETERMINED BY BIOPSY: ICD-10-CM

## 2022-12-14 DIAGNOSIS — I85.10 SECONDARY ESOPHAGEAL VARICES WITHOUT BLEEDING: ICD-10-CM

## 2022-12-14 DIAGNOSIS — F10.20 UNCOMPLICATED ALCOHOL DEPENDENCE: ICD-10-CM

## 2022-12-14 PROBLEM — 91019004: Status: ACTIVE | Noted: 2021-12-23

## 2022-12-14 PROBLEM — 302914006: Status: ACTIVE | Noted: 2022-04-13

## 2022-12-14 PROBLEM — 193462001: Status: ACTIVE | Noted: 2021-12-23

## 2022-12-14 PROBLEM — 66590003: Status: ACTIVE | Noted: 2021-10-25

## 2022-12-14 PROBLEM — 238107002: Status: ACTIVE | Noted: 2021-10-25

## 2022-12-14 PROBLEM — 420054005: Status: ACTIVE | Noted: 2021-10-13

## 2022-12-14 PROBLEM — 34742003: Status: ACTIVE | Noted: 2021-10-25

## 2022-12-14 PROBLEM — 1082601000119104: Status: ACTIVE | Noted: 2021-10-25

## 2022-12-14 PROBLEM — 14223005: Status: ACTIVE | Noted: 2021-10-25

## 2022-12-14 PROCEDURE — 99214 OFFICE O/P EST MOD 30 MIN: CPT | Performed by: INTERNAL MEDICINE

## 2022-12-14 RX ORDER — SODIUM SULFATE, MAGNESIUM SULFATE, AND POTASSIUM CHLORIDE 17.75; 2.7; 2.25 G/1; G/1; G/1
12 TABLETS TABLET ORAL
Qty: 24 TABLETS | Refills: 0 | OUTPATIENT
Start: 2022-12-14 | End: 2022-12-15

## 2022-12-14 RX ORDER — SPIRONOLACTONE 100 MG/1
1 TABLET TABLET, FILM COATED ORAL BID
Qty: 180 TABLET | Refills: 3 | Status: DISCONTINUED | COMMUNITY

## 2022-12-14 RX ORDER — PANTOPRAZOLE SODIUM 40 MG/1
1 TABLET TABLET, DELAYED RELEASE ORAL ONCE A DAY
Qty: 90 TABLET | Refills: 2 | Status: ACTIVE | COMMUNITY

## 2022-12-14 RX ORDER — LACTULOSE 10 G/15ML
15 ML SOLUTION ORAL BID
Qty: 2700 ML | Refills: 3 | Status: ACTIVE | COMMUNITY
Start: 2022-02-23 | End: 2023-09-16

## 2022-12-14 RX ORDER — SPIRONOLACTONE 100 MG/1
1 TABLET TABLET, FILM COATED ORAL BID
Qty: 180 TABLET | Refills: 3 | Status: ON HOLD | COMMUNITY

## 2022-12-14 RX ORDER — DULOXETINE HYDROCHLORIDE 60 MG/1
1 CAPSULE CAPSULE, DELAYED RELEASE ORAL ONCE A DAY
Status: ACTIVE | COMMUNITY

## 2022-12-14 RX ORDER — FUROSEMIDE 40 MG/1
1 TABLET TABLET ORAL BID
Qty: 180 TABLET | Refills: 3 | Status: ON HOLD | COMMUNITY

## 2022-12-14 RX ORDER — ZOLPIDEM TARTRATE 5 MG/1
1 TABLET AT BEDTIME TABLET, FILM COATED ORAL
Refills: 1 | Status: ON HOLD | COMMUNITY
Start: 2021-12-23

## 2022-12-14 RX ORDER — ICOSAPENT ETHYL 1000 MG/1
2 CAPSULES WITH MEALS CAPSULE ORAL TWICE A DAY
Status: ON HOLD | COMMUNITY

## 2022-12-14 NOTE — PHYSICAL EXAM GASTROINTESTINAL
Abdomen , soft, nontender, moderately distended with ascites--resolved,  no guarding or rigidity , no masses palpable , normal bowel sounds , Liver and Spleen , no hepatosplenomegaly palpable, liver nontender , spleen not palpable
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
## 2022-12-14 NOTE — PHYSICAL EXAM CONSTITUTIONAL:
chronically ill appearing but improved nutrition status, in no acute distress , ambulating without difficulty , normal communication ability

## 2022-12-15 PROBLEM — 302914006 BARRETTS ESOPHAGUS: Status: ACTIVE | Noted: 2020-10-13

## 2022-12-15 PROBLEM — 428283002: Status: ACTIVE | Noted: 2022-12-14

## 2022-12-28 ENCOUNTER — OFFICE VISIT (OUTPATIENT)
Dept: URBAN - METROPOLITAN AREA SURGERY CENTER 14 | Facility: SURGERY CENTER | Age: 57
End: 2022-12-28
Payer: COMMERCIAL

## 2022-12-28 DIAGNOSIS — K74.60 ADVANCED CIRRHOSIS: ICD-10-CM

## 2022-12-28 DIAGNOSIS — Z86.010 ADENOMAS PERSONAL HISTORY OF COLONIC POLYPS: ICD-10-CM

## 2022-12-28 DIAGNOSIS — K31.89 ACQUIRED DEFORMITY OF DUODENUM: ICD-10-CM

## 2022-12-28 PROCEDURE — G0105 COLORECTAL SCRN; HI RISK IND: HCPCS | Performed by: INTERNAL MEDICINE

## 2022-12-28 PROCEDURE — 43235 EGD DIAGNOSTIC BRUSH WASH: CPT | Performed by: INTERNAL MEDICINE

## 2022-12-28 PROCEDURE — G8907 PT DOC NO EVENTS ON DISCHARG: HCPCS | Performed by: INTERNAL MEDICINE

## 2022-12-28 RX ORDER — ZOLPIDEM TARTRATE 5 MG/1
1 TABLET AT BEDTIME TABLET, FILM COATED ORAL
Refills: 1 | Status: ON HOLD | COMMUNITY
Start: 2021-12-23

## 2022-12-28 RX ORDER — DULOXETINE HYDROCHLORIDE 60 MG/1
1 CAPSULE CAPSULE, DELAYED RELEASE ORAL ONCE A DAY
Status: ACTIVE | COMMUNITY

## 2022-12-28 RX ORDER — ICOSAPENT ETHYL 1000 MG/1
2 CAPSULES WITH MEALS CAPSULE ORAL TWICE A DAY
Status: ON HOLD | COMMUNITY

## 2022-12-28 RX ORDER — PANTOPRAZOLE SODIUM 40 MG/1
1 TABLET TABLET, DELAYED RELEASE ORAL ONCE A DAY
Qty: 90 TABLET | Refills: 2 | Status: ACTIVE | COMMUNITY

## 2022-12-28 RX ORDER — FUROSEMIDE 40 MG/1
1 TABLET TABLET ORAL BID
Qty: 180 TABLET | Refills: 3 | Status: ON HOLD | COMMUNITY

## 2022-12-28 RX ORDER — LACTULOSE 10 G/15ML
15 ML SOLUTION ORAL BID
Qty: 2700 ML | Refills: 3 | Status: ACTIVE | COMMUNITY
Start: 2022-02-23 | End: 2023-09-16

## 2022-12-28 RX ORDER — SPIRONOLACTONE 100 MG/1
1 TABLET TABLET, FILM COATED ORAL BID
Qty: 180 TABLET | Refills: 3 | Status: ON HOLD | COMMUNITY

## 2023-04-03 ENCOUNTER — TELEPHONE ENCOUNTER (OUTPATIENT)
Dept: URBAN - METROPOLITAN AREA CLINIC 54 | Facility: CLINIC | Age: 58
End: 2023-04-03

## 2023-06-05 ENCOUNTER — OFFICE VISIT (OUTPATIENT)
Dept: URBAN - METROPOLITAN AREA CLINIC 54 | Facility: CLINIC | Age: 58
End: 2023-06-05

## 2023-06-09 ENCOUNTER — DASHBOARD ENCOUNTERS (OUTPATIENT)
Age: 58
End: 2023-06-09

## 2023-06-30 ENCOUNTER — OFFICE VISIT (OUTPATIENT)
Dept: URBAN - METROPOLITAN AREA CLINIC 54 | Facility: CLINIC | Age: 58
End: 2023-06-30

## 2023-06-30 RX ORDER — DULOXETINE 60 MG/1
1 CAPSULE CAPSULE, DELAYED RELEASE PELLETS ORAL ONCE A DAY
Status: ACTIVE | COMMUNITY

## 2023-06-30 RX ORDER — METHOCARBAMOL 500 MG/1
1.5 TABLETS TABLET ORAL
Status: ACTIVE | COMMUNITY

## 2023-06-30 RX ORDER — THIAMINE HCL 100 MG
AS DIRECTED TABLET ORAL
Status: ACTIVE | COMMUNITY

## 2023-06-30 RX ORDER — ZOLPIDEM TARTRATE 5 MG/1
1 TABLET AT BEDTIME TABLET, FILM COATED ORAL ONCE A DAY
Status: ACTIVE | COMMUNITY

## 2023-06-30 RX ORDER — LACTULOSE 10 G/15ML
15 ML SOLUTION ORAL BID
Qty: 2700 ML | Refills: 3 | Status: ACTIVE | COMMUNITY
Start: 2022-02-23 | End: 2023-09-16

## 2023-06-30 RX ORDER — PANTOPRAZOLE SODIUM 40 MG/1
1 TABLET TABLET, DELAYED RELEASE ORAL ONCE A DAY
Qty: 90 TABLET | Refills: 2 | Status: ACTIVE | COMMUNITY

## 2023-06-30 RX ORDER — HYDROXYZINE HYDROCHLORIDE 10 MG/1
AS DIRECTED TABLET, FILM COATED ORAL
Status: ACTIVE | COMMUNITY

## 2023-06-30 RX ORDER — TESTOSTERONE CYPIONATE 200 MG/ML
1 ML INJECTION INTRAMUSCULAR
Status: ACTIVE | COMMUNITY

## 2023-09-18 ENCOUNTER — OFFICE VISIT (OUTPATIENT)
Dept: URBAN - METROPOLITAN AREA CLINIC 54 | Facility: CLINIC | Age: 58
End: 2023-09-18

## 2023-10-11 ENCOUNTER — OFFICE VISIT (OUTPATIENT)
Dept: URBAN - NONMETROPOLITAN AREA CLINIC 4 | Facility: CLINIC | Age: 58
End: 2023-10-11

## 2023-12-01 ENCOUNTER — OFFICE VISIT (OUTPATIENT)
Dept: URBAN - NONMETROPOLITAN AREA CLINIC 4 | Facility: CLINIC | Age: 58
End: 2023-12-01

## 2023-12-14 ENCOUNTER — TELEPHONE ENCOUNTER (OUTPATIENT)
Dept: URBAN - METROPOLITAN AREA CLINIC 54 | Facility: CLINIC | Age: 58
End: 2023-12-14

## 2023-12-14 RX ORDER — LACTULOSE 10 G/15ML
15 ML SOLUTION ORAL TWICE A DAY
Qty: 2700 ML | Refills: 3
Start: 2022-02-23 | End: 2024-12-08

## 2023-12-18 ENCOUNTER — P2P PATIENT RECORD (OUTPATIENT)
Age: 58
End: 2023-12-18

## 2024-02-02 ENCOUNTER — OV EP (OUTPATIENT)
Dept: URBAN - NONMETROPOLITAN AREA CLINIC 4 | Facility: CLINIC | Age: 59
End: 2024-02-02

## 2024-04-17 ENCOUNTER — OV EP (OUTPATIENT)
Dept: URBAN - NONMETROPOLITAN AREA CLINIC 4 | Facility: CLINIC | Age: 59
End: 2024-04-17

## 2024-06-12 ENCOUNTER — OFFICE VISIT (OUTPATIENT)
Dept: URBAN - NONMETROPOLITAN AREA CLINIC 4 | Facility: CLINIC | Age: 59
End: 2024-06-12

## 2024-07-21 ENCOUNTER — ERX REFILL RESPONSE (OUTPATIENT)
Dept: URBAN - METROPOLITAN AREA CLINIC 54 | Facility: CLINIC | Age: 59
End: 2024-07-21

## 2024-07-21 RX ORDER — RIFAXIMIN 550 MG/1
TAKE 1 TABLET BY MOUTH TWICE A DAY FOR 90 DAYS TABLET ORAL
Qty: 60 TABLET | Refills: 12 | OUTPATIENT

## 2024-07-21 RX ORDER — RIFAXIMIN 550 MG/1
TAKE 1 TABLET BY MOUTH TWICE A DAY FOR 90 DAYS TABLET ORAL
Qty: 60 TABLET | Refills: 11 | OUTPATIENT

## 2024-08-16 ENCOUNTER — OFFICE VISIT (OUTPATIENT)
Dept: URBAN - METROPOLITAN AREA CLINIC 54 | Facility: CLINIC | Age: 59
End: 2024-08-16

## 2024-12-06 ENCOUNTER — TELEPHONE ENCOUNTER (OUTPATIENT)
Dept: URBAN - NONMETROPOLITAN AREA CLINIC 4 | Facility: CLINIC | Age: 59
End: 2024-12-06

## 2025-01-03 ENCOUNTER — OFFICE VISIT (OUTPATIENT)
Dept: URBAN - NONMETROPOLITAN AREA CLINIC 4 | Facility: CLINIC | Age: 60
End: 2025-01-03
Payer: COMMERCIAL

## 2025-01-03 ENCOUNTER — LAB OUTSIDE AN ENCOUNTER (OUTPATIENT)
Dept: URBAN - NONMETROPOLITAN AREA CLINIC 4 | Facility: CLINIC | Age: 60
End: 2025-01-03

## 2025-01-03 VITALS
SYSTOLIC BLOOD PRESSURE: 150 MMHG | BODY MASS INDEX: 26.88 KG/M2 | DIASTOLIC BLOOD PRESSURE: 70 MMHG | HEIGHT: 71 IN | TEMPERATURE: 97.8 F | WEIGHT: 192 LBS | HEART RATE: 86 BPM

## 2025-01-03 DIAGNOSIS — K22.70 BARRETT'S ESOPHAGUS DETERMINED BY BIOPSY: ICD-10-CM

## 2025-01-03 DIAGNOSIS — I85.10 SECONDARY ESOPHAGEAL VARICES WITHOUT BLEEDING: ICD-10-CM

## 2025-01-03 DIAGNOSIS — K76.6 PORTAL HYPERTENSION: ICD-10-CM

## 2025-01-03 DIAGNOSIS — Z72.0 TOBACCO ABUSE: ICD-10-CM

## 2025-01-03 DIAGNOSIS — Z86.0100 HISTORY OF COLON POLYPS: ICD-10-CM

## 2025-01-03 DIAGNOSIS — K70.31 ALCOHOLIC CIRRHOSIS OF LIVER WITH ASCITES: ICD-10-CM

## 2025-01-03 DIAGNOSIS — E43 SEVERE PROTEIN-CALORIE MALNUTRITION: ICD-10-CM

## 2025-01-03 DIAGNOSIS — G47.00 INSOMNIA, UNSPECIFIED TYPE: ICD-10-CM

## 2025-01-03 DIAGNOSIS — K31.89 OTHER DISEASES OF STOMACH AND DUODENUM: ICD-10-CM

## 2025-01-03 DIAGNOSIS — F10.20 UNCOMPLICATED ALCOHOL DEPENDENCE: ICD-10-CM

## 2025-01-03 PROCEDURE — 99215 OFFICE O/P EST HI 40 MIN: CPT | Performed by: REGISTERED NURSE

## 2025-01-03 RX ORDER — RIFAXIMIN 550 MG/1
TAKE 1 TABLET BY MOUTH TWICE A DAY FOR 90 DAYS TABLET ORAL
Qty: 60 | Refills: 11

## 2025-01-03 RX ORDER — DULOXETINE HYDROCHLORIDE 60 MG/1
1 CAPSULE CAPSULE, DELAYED RELEASE ORAL ONCE A DAY
Status: ON HOLD | COMMUNITY

## 2025-01-03 RX ORDER — ZOLPIDEM TARTRATE 5 MG/1
1 TABLET AT BEDTIME TABLET, FILM COATED ORAL
Refills: 1 | Status: ON HOLD | COMMUNITY
Start: 2021-12-23

## 2025-01-03 RX ORDER — THIAMINE HCL 100 MG
AS DIRECTED TABLET ORAL
Status: ON HOLD | COMMUNITY

## 2025-01-03 RX ORDER — RIFAXIMIN 550 MG/1
TAKE 1 TABLET BY MOUTH TWICE A DAY FOR 90 DAYS TABLET ORAL
Qty: 60 TABLET | Refills: 11 | Status: ACTIVE | COMMUNITY

## 2025-01-03 RX ORDER — LACTULOSE 10 G/15ML
15 ML SOLUTION ORAL TWICE A DAY
Qty: 2700 ML | Refills: 3
Start: 2022-02-23 | End: 2025-12-29

## 2025-01-03 RX ORDER — ICOSAPENT ETHYL 1000 MG/1
2 CAPSULES WITH MEALS CAPSULE ORAL TWICE A DAY
Status: ON HOLD | COMMUNITY

## 2025-01-03 RX ORDER — FUROSEMIDE 40 MG/1
1 TABLET TABLET ORAL BID
Qty: 180 TABLET | Refills: 3 | Status: ON HOLD | COMMUNITY

## 2025-01-03 RX ORDER — DULOXETINE 60 MG/1
1 CAPSULE CAPSULE, DELAYED RELEASE PELLETS ORAL ONCE A DAY
Status: ON HOLD | COMMUNITY

## 2025-01-03 RX ORDER — PANTOPRAZOLE SODIUM 40 MG/1
1 TABLET TABLET, DELAYED RELEASE ORAL ONCE A DAY
Qty: 90 TABLET | Refills: 2 | Status: ACTIVE | COMMUNITY

## 2025-01-03 RX ORDER — TESTOSTERONE CYPIONATE 200 MG/ML
1 ML INJECTION INTRAMUSCULAR
Status: ON HOLD | COMMUNITY

## 2025-01-03 RX ORDER — HYDROXYZINE HYDROCHLORIDE 10 MG/1
AS DIRECTED TABLET, FILM COATED ORAL
Status: ON HOLD | COMMUNITY

## 2025-01-03 RX ORDER — ZOLPIDEM TARTRATE 5 MG/1
1 TABLET AT BEDTIME TABLET, FILM COATED ORAL ONCE A DAY
Status: ACTIVE | COMMUNITY

## 2025-01-03 RX ORDER — SPIRONOLACTONE 100 MG/1
1 TABLET TABLET, FILM COATED ORAL BID
Qty: 180 TABLET | Refills: 3 | Status: ON HOLD | COMMUNITY

## 2025-01-03 RX ORDER — METHOCARBAMOL 500 MG/1
1.5 TABLETS TABLET ORAL
Status: ON HOLD | COMMUNITY
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
## 2025-06-06 ENCOUNTER — OFFICE VISIT (OUTPATIENT)
Dept: URBAN - NONMETROPOLITAN AREA CLINIC 4 | Facility: CLINIC | Age: 60
End: 2025-06-06